# Patient Record
Sex: MALE | Race: BLACK OR AFRICAN AMERICAN | NOT HISPANIC OR LATINO | Employment: OTHER | URBAN - METROPOLITAN AREA
[De-identification: names, ages, dates, MRNs, and addresses within clinical notes are randomized per-mention and may not be internally consistent; named-entity substitution may affect disease eponyms.]

---

## 2017-05-21 ENCOUNTER — HOSPITAL ENCOUNTER (EMERGENCY)
Facility: OTHER | Age: 47
Discharge: HOME | End: 2017-05-21
Attending: EMERGENCY MEDICINE
Payer: COMMERCIAL

## 2017-05-21 VITALS
BODY MASS INDEX: 38.43 KG/M2 | WEIGHT: 290 LBS | SYSTOLIC BLOOD PRESSURE: 140 MMHG | RESPIRATION RATE: 12 BRPM | HEIGHT: 73 IN | TEMPERATURE: 98 F | OXYGEN SATURATION: 96 % | HEART RATE: 78 BPM | DIASTOLIC BLOOD PRESSURE: 88 MMHG

## 2017-05-21 DIAGNOSIS — R07.9 CHEST PAIN: ICD-10-CM

## 2017-05-21 DIAGNOSIS — R07.89 ATYPICAL CHEST PAIN: Primary | ICD-10-CM

## 2017-05-21 LAB
ALBUMIN SERPL BCP-MCNC: 3.8 G/DL
ALLENS TEST: ABNORMAL
ALP SERPL-CCNC: 57 U/L
ALT SERPL W/O P-5'-P-CCNC: 17 U/L
ANION GAP SERPL CALC-SCNC: 19 MMOL/L
APTT BLDCRRT: 26.5 SEC
AST SERPL-CCNC: 15 U/L
BASOPHILS # BLD AUTO: 0.01 K/UL
BASOPHILS NFR BLD: 0.2 %
BILIRUB SERPL-MCNC: 0.7 MG/DL
BUN SERPL-MCNC: 15 MG/DL
CALCIUM SERPL-MCNC: 9.6 MG/DL
CHLORIDE SERPL-SCNC: 102 MMOL/L
CK SERPL-CCNC: 203 U/L
CO2 SERPL-SCNC: 16 MMOL/L
CREAT SERPL-MCNC: 1 MG/DL
DELSYS: ABNORMAL
DIFFERENTIAL METHOD: ABNORMAL
EOSINOPHIL # BLD AUTO: 0.2 K/UL
EOSINOPHIL NFR BLD: 3.7 %
ERYTHROCYTE [DISTWIDTH] IN BLOOD BY AUTOMATED COUNT: 13.8 %
ERYTHROCYTE [SEDIMENTATION RATE] IN BLOOD BY WESTERGREN METHOD: 18 MM/H
EST. GFR  (AFRICAN AMERICAN): >60 ML/MIN/1.73 M^2
EST. GFR  (NON AFRICAN AMERICAN): >60 ML/MIN/1.73 M^2
FIO2: 21
GIANT PLATELETS BLD QL SMEAR: PRESENT
GLUCOSE SERPL-MCNC: 184 MG/DL
HCO3 UR-SCNC: 29.4 MMOL/L (ref 24–28)
HCT VFR BLD AUTO: 41.3 %
HGB BLD-MCNC: 14 G/DL
INR PPP: 1
LYMPHOCYTES # BLD AUTO: 1.7 K/UL
LYMPHOCYTES NFR BLD: 36.4 %
MAGNESIUM SERPL-MCNC: 1.9 MG/DL
MCH RBC QN AUTO: 28.5 PG
MCHC RBC AUTO-ENTMCNC: 33.9 %
MCV RBC AUTO: 84 FL
MODE: ABNORMAL
MONOCYTES # BLD AUTO: 0.3 K/UL
MONOCYTES NFR BLD: 6.7 %
NEUTROPHILS # BLD AUTO: 2.5 K/UL
NEUTROPHILS NFR BLD: 53 %
PCO2 BLDA: 46 MMHG (ref 35–45)
PH SMN: 7.41 [PH] (ref 7.35–7.45)
PLATELET # BLD AUTO: 124 K/UL
PLATELET BLD QL SMEAR: ABNORMAL
PMV BLD AUTO: 12.8 FL
PO2 BLDA: 34 MMHG (ref 40–60)
POC BE: 5 MMOL/L
POC SATURATED O2: 65 % (ref 95–100)
POTASSIUM SERPL-SCNC: 3.7 MMOL/L
PROT SERPL-MCNC: 8.1 G/DL
PROTHROMBIN TIME: 10.7 SEC
RBC # BLD AUTO: 4.92 M/UL
SAMPLE: ABNORMAL
SITE: ABNORMAL
SODIUM SERPL-SCNC: 137 MMOL/L
SP02: 96
TROPONIN I SERPL DL<=0.01 NG/ML-MCNC: 0.01 NG/ML
TROPONIN I SERPL DL<=0.01 NG/ML-MCNC: 0.01 NG/ML
WBC # BLD AUTO: 4.62 K/UL

## 2017-05-21 PROCEDURE — 80053 COMPREHEN METABOLIC PANEL: CPT

## 2017-05-21 PROCEDURE — 85610 PROTHROMBIN TIME: CPT

## 2017-05-21 PROCEDURE — 85025 COMPLETE CBC W/AUTO DIFF WBC: CPT

## 2017-05-21 PROCEDURE — 85730 THROMBOPLASTIN TIME PARTIAL: CPT

## 2017-05-21 PROCEDURE — 82550 ASSAY OF CK (CPK): CPT

## 2017-05-21 PROCEDURE — 83735 ASSAY OF MAGNESIUM: CPT

## 2017-05-21 PROCEDURE — 99900035 HC TECH TIME PER 15 MIN (STAT)

## 2017-05-21 PROCEDURE — 84484 ASSAY OF TROPONIN QUANT: CPT | Mod: 91

## 2017-05-21 PROCEDURE — 99284 EMERGENCY DEPT VISIT MOD MDM: CPT

## 2017-05-21 PROCEDURE — 82803 BLOOD GASES ANY COMBINATION: CPT

## 2017-05-21 PROCEDURE — 93005 ELECTROCARDIOGRAM TRACING: CPT

## 2017-05-21 PROCEDURE — 25000003 PHARM REV CODE 250: Performed by: EMERGENCY MEDICINE

## 2017-05-21 RX ORDER — SUCRALFATE 1 G/10ML
1 SUSPENSION ORAL
Status: COMPLETED | OUTPATIENT
Start: 2017-05-21 | End: 2017-05-21

## 2017-05-21 RX ORDER — METFORMIN HYDROCHLORIDE EXTENDED-RELEASE TABLETS 500 MG/1
500 TABLET, FILM COATED, EXTENDED RELEASE ORAL
COMMUNITY
End: 2017-05-23

## 2017-05-21 RX ORDER — SUCRALFATE 1 G/1
1 TABLET ORAL 2 TIMES DAILY
Qty: 10 TABLET | Refills: 0 | Status: SHIPPED | OUTPATIENT
Start: 2017-05-21 | End: 2017-05-26

## 2017-05-21 RX ORDER — ASPIRIN 81 MG/1
81 TABLET ORAL DAILY
COMMUNITY

## 2017-05-21 RX ADMIN — SUCRALFATE 1 G: 1 SUSPENSION ORAL at 04:05

## 2017-05-21 NOTE — ED NOTES
Hourly rounding completed on pt. Pt reporting intermittent 2/10 chest discomfort, last occurring at approx 1818, denies SOB, fevers, chills, N/V/D. NAD, pt resting comfortably in stretcher, even, unlabored respirations. Bed in lowest, locked position, side rails up x 2.

## 2017-05-21 NOTE — ED PROVIDER NOTES
"Encounter Date: 5/21/2017    SCRIBE #1 NOTE: I, Viraul Marquez, am scribing for, and in the presence of, Dr. Bryant.       History     Chief Complaint   Patient presents with    Chest Pain     Pt sent from urgent care for eval of ST elevation noted on EKG taken there. Pt reports midsternal chest pain that started last night. Dizziness. Denies SOB, HA, n/v.      Review of patient's allergies indicates:  Allergies no known allergies  Time seen by provider: 3:50 PM    This is a 47 y.o. male who presents to the ED on the referral of Urgent Care with complaint of intermittent CP that has persisted for the past week. The patient states that his discomfort has felt similar to a "gas-like" sensation, which occasionally radiates to the middle of his back.  He states that his discomfort is worse after eating, but not with exertion. He reports that he has had some eructation, which has provided some relief from his discomfort. Although he attempted to alleviate his discomfort with Tums, he claims that he found no relief. This morning, the patient states that he began to feel a numbness in his chest, "like I couldn't feel my heart beat."  The pain did not move into his jaw or arms.  There was no associated shortness of breath or diaphoresis.  He denies fever, chills, congestion, abdominal pain, nausea or vomiting.  He also denies dysuria, changes in urinary frequency or urgency. He reports no identifying, alleviating, or exacerbating factors.     He admits to history of DM and HLD, for which he is supposed to take medication. The patient admits that he is non-compliant with his diabetes medication and controls his diabetes with diet. He also admits that he is non-compliant with his HLD medication, as he frequently forgets to take it. He does not have a PCP currently. He has never seen a cardiologist nor had a prior stress test or cardiac catheterization. Although he no longer smokes cigarettes, he admits that he smoked 1 pack " of cigarettes per day for approximately 30 years.  He has no additional complaints.    Additional past medical, surgical, and social history as outlined in the nursing assessment was reviewed by me.     The history is provided by the patient.     Past Medical History:   Diagnosis Date    Diabetes mellitus     High cholesterol      History reviewed. No pertinent surgical history.  History reviewed. No pertinent family history.  Social History   Substance Use Topics    Smoking status: Former Smoker    Smokeless tobacco: Not on file    Alcohol use No     Review of Systems   Constitutional: Negative for chills, diaphoresis and fever.   HENT: Negative for congestion, rhinorrhea and sore throat.         Negative for jaw pain.   Respiratory: Negative for cough and shortness of breath.    Cardiovascular: Positive for chest pain. Negative for leg swelling.   Gastrointestinal: Negative for abdominal pain, diarrhea, nausea and vomiting.        Positive for eructation.   Endocrine: Negative for polyuria.   Genitourinary: Negative for dysuria, frequency and urgency.   Musculoskeletal: Positive for back pain.        Negative for arm pain.   Skin: Negative for rash.   Allergic/Immunologic: Negative for immunocompromised state.   Neurological: Negative for dizziness and weakness.   Hematological: Negative for adenopathy. Does not bruise/bleed easily.   Psychiatric/Behavioral: Negative for confusion.       Physical Exam     Initial Vitals [05/21/17 1457]   BP Pulse Resp Temp SpO2   (!) 169/94 89 20 98 °F (36.7 °C) 98 %     Physical Exam    Nursing note and vitals reviewed.  Constitutional: Vital signs are normal. He appears well-developed and well-nourished. He is not diaphoretic. He does not appear ill. No distress.   HENT:   Head: Normocephalic and atraumatic.   Right Ear: External ear normal.   Left Ear: External ear normal.   Eyes: Conjunctivae and EOM are normal. Right eye exhibits no discharge. Left eye exhibits no  discharge. No scleral icterus.   Neck: Normal range of motion. Neck supple. No tracheal deviation present. No JVD present.   Cardiovascular: Normal rate, regular rhythm, S1 normal, S2 normal, normal heart sounds and intact distal pulses. Exam reveals no gallop and no friction rub.    No murmur heard.  Pulmonary/Chest: Breath sounds normal. No stridor. No respiratory distress. He has no rhonchi. He has no rales. He exhibits no tenderness.   Abdominal: Soft. Bowel sounds are normal. He exhibits no distension and no mass. There is no tenderness. There is no rebound and no guarding.   Musculoskeletal: Normal range of motion. He exhibits edema.   Trace edema to bilateral lower extremities.   Neurological: He is alert and oriented to person, place, and time. He has normal strength. No cranial nerve deficit. He exhibits normal muscle tone. Gait normal.   Skin: Skin is warm and dry. Capillary refill takes less than 2 seconds. No rash noted. No pallor.   Psychiatric: He has a normal mood and affect. Thought content normal.         ED Course   Procedures  Labs Reviewed   CBC W/ AUTO DIFFERENTIAL - Abnormal; Notable for the following:        Result Value    Platelets 124 (*)     Platelet Estimate Decreased (*)     All other components within normal limits   COMPREHENSIVE METABOLIC PANEL - Abnormal; Notable for the following:     CO2 16 (*)     Glucose 184 (*)     Anion Gap 19 (*)     All other components within normal limits    Narrative:     Recoll. [id number] by JAMES at 05/21/2017 16:11, reason: 3+ homolyzed   specimen. Notified to Otoniel Braeden.   CK - Abnormal; Notable for the following:      (*)     All other components within normal limits    Narrative:     Recoll. [id number] by JAMES at 05/21/2017 16:11, reason: 3+ homolyzed   specimen. Notified to Otoniel Braeden.   ISTAT PROCEDURE - Abnormal; Notable for the following:     POC PCO2 46.0 (*)     POC PO2 34 (*)     POC HCO3 29.4 (*)     POC SATURATED O2 65 (*)      All other components within normal limits   PROTIME-INR   APTT   TROPONIN I   MAGNESIUM    Narrative:     Recoll. [id number] by JAMES at 05/21/2017 16:11, reason: 3+ homolyzed   specimen. Notified to Otoniel Deras.   TROPONIN I      Imaging Results          X-Ray Chest AP Portable (Final result)  Result time 05/21/17 15:39:16    Final result by Alok Oglesby MD (05/21/17 15:39:16)                 Impression:       No acute intrathoracic process.          Electronically signed by: ALOK OGLESBY MD  Date:     05/21/17  Time:    15:39              Narrative:    [id number]  Accession # [id number]      Study:  XR CHEST AP PORTABLE    Indication: Chest pain.    Comparison: None.    Findings:     XR CHEST AP PORTABLE.    Cardiac silhouette is normal in appearance.  Pulmonary vasculature is within normal limits.    Lungs well-aerated.  No focal consolidation.   No pleural effusion.  Osseous structures demonstrate no significant abnormalities.                              EKG Readings: (Independently Interpreted)   Initial Reading: No STEMI.   Rhythm: Normal Sinus Rhythm. Heart Rate: 87. Ectopy: No Ectopy. Conduction: Normal. ST Segments: 1 mm ST Elevation in V3. No reciprocal changes. T Waves: Normal. Q waves: Q wave in V2. Clinical Impression: Normal. This EKG is unchanged from 1 hour prior at Urgent Care.        X-Rays:   Independently Interpreted Readings:   Chest X-Ray: X-Ray Chest AP Portable: No infiltrate, effusion, pneumothorax, or acute process.      Medical Decision Making:   Initial Assessment:   Patient presents with CP. He is low risk for ACS; however, he has signs of septal injury on EKG with no prior EKG for comparison. He attributes his symptoms to gas. I will give him Carafate at this time. I will send a 0 and 3 hour troponins. I will reassess.  Independently Interpreted Test(s):   I have ordered and independently interpreted X-rays - see prior notes.  I have ordered and independently interpreted EKG  Reading(s) - see prior notes  Clinical Tests:   Lab Tests: Ordered and Reviewed  Radiological Study: Ordered and Reviewed  Medical Tests: Ordered and Reviewed  ED Management:  19:00 - patient's troponins were normal. His mediastinum was normal on his xray. I did not suspect aortic dissection. His symptoms resolved prior to receiving Carafate but he notes brief, intermittent return of his discomfort throughout the ED course.  He was pain-free during my last assessment.  I stressed to him the importance of close PCP follow-up to obtain outpatient stress test.  He understands PCP could refer him to a gastroenterologist for an upper endoscopy to rule out ulcer or esophageal spasm as the source of his discomfort.  He also needs PCP to evaluate his diabetes.  He remains comfortable and well-appearing. I have discussed with patient the diagnostic results, diagnosis, treatment plan, and need for follow-up. Patient has expressed understanding of my instructions. I am comfortable with his discharge home at this time.              Scribe Attestation:   Scribe #1: I performed the above scribed service and the documentation accurately describes the services I performed. I attest to the accuracy of the note.    Attending Attestation:           Physician Attestation for Scribe:  Physician Attestation Statement for Scribe #1: I, Dr. Bryant, reviewed documentation, as scribed by Vi Marquez in my presence, and it is both accurate and complete.                 ED Course     1. Atypical chest pain    2. Chest pain             Marlen Bryant MD  05/22/17 0283

## 2017-05-21 NOTE — ED NOTES
Pt ambulated to restroom with slow and steady gait. Pt reporting continued 2/10 chest discomfort. Denies lightheadedness/dizziness. Will continue to monitor.

## 2017-05-21 NOTE — ED TRIAGE NOTES
"Pt reports to ED c/o 2/10 midsternal chest discomfort, pt describes pain as "heavy, numbness" x 2 days, pt states "woke up this morning not feeling like myself" with episode of dizziness, fatigue. Pt admits to having "acid reflux" x 1 week unrelieved from tums. Denies SOB, N/V/D, swelling to lower extremities, fevers. PMH of hyperlipidemia, noncompliant with metformin. AAO x 3.   "

## 2017-05-21 NOTE — ED NOTES
Hourly rounding completed on pt, MD at bedside discussing plan of care. Pt reporting 0/10 chest pain at this time. Bed in lowest, locked position, side rails up x 2. Call light within reach.

## 2017-05-23 ENCOUNTER — OFFICE VISIT (OUTPATIENT)
Dept: INTERNAL MEDICINE | Facility: CLINIC | Age: 47
End: 2017-05-23
Payer: COMMERCIAL

## 2017-05-23 VITALS
BODY MASS INDEX: 38.49 KG/M2 | SYSTOLIC BLOOD PRESSURE: 128 MMHG | WEIGHT: 290.44 LBS | HEIGHT: 73 IN | DIASTOLIC BLOOD PRESSURE: 88 MMHG | HEART RATE: 79 BPM | OXYGEN SATURATION: 96 %

## 2017-05-23 DIAGNOSIS — R07.9 CHEST PAIN, UNSPECIFIED TYPE: Primary | ICD-10-CM

## 2017-05-23 DIAGNOSIS — E55.9 VITAMIN D DEFICIENCY DISEASE: ICD-10-CM

## 2017-05-23 DIAGNOSIS — E13.9 DIABETES MELLITUS DUE TO ABNORMAL INSULIN: ICD-10-CM

## 2017-05-23 DIAGNOSIS — E53.8 VITAMIN B12 DEFICIENCY: ICD-10-CM

## 2017-05-23 LAB
CREAT UR-MCNC: 144 MG/DL
MICROALBUMIN UR DL<=1MG/L-MCNC: 330 UG/ML
MICROALBUMIN/CREATININE RATIO: 229.2 UG/MG

## 2017-05-23 PROCEDURE — 82570 ASSAY OF URINE CREATININE: CPT

## 2017-05-23 PROCEDURE — 99999 PR PBB SHADOW E&M-EST. PATIENT-LVL III: CPT | Mod: PBBFAC,,, | Performed by: INTERNAL MEDICINE

## 2017-05-23 PROCEDURE — 3060F POS MICROALBUMINURIA REV: CPT | Mod: 8P,S$GLB,, | Performed by: INTERNAL MEDICINE

## 2017-05-23 PROCEDURE — 99205 OFFICE O/P NEW HI 60 MIN: CPT | Mod: S$GLB,,, | Performed by: INTERNAL MEDICINE

## 2017-05-23 PROCEDURE — 1160F RVW MEDS BY RX/DR IN RCRD: CPT | Mod: S$GLB,,, | Performed by: INTERNAL MEDICINE

## 2017-05-23 PROCEDURE — 3046F HEMOGLOBIN A1C LEVEL >9.0%: CPT | Mod: 8P,S$GLB,, | Performed by: INTERNAL MEDICINE

## 2017-05-23 NOTE — PROGRESS NOTES
Chief Complaint: Left arm numbness and pain    HPI: This is a 47 year old man who presents for an initial visit due to left arm pain and numbness. He had an episode of left arm pain and numbness in the left forearm for 30 minutes while he had his left hand on the steering wheel.  Last night he had a heaviness in the left arm while driving - last 20 to 30 minutes.  No associated neck pain, chest pain, shortness of breath, palpitaions, nausea, vomiting, constipoation, diarrhea.     He presented to the ED on 17 due to chest pain. He states he has intermittent nagging sensation in this chest.  Pain is mostly at night and keep him awake in the middle of the night. HE was diagnosed with GERD in the ED and was placed on sucralfate which has not hlped    He has diabetes and high cholesterol. He is not taking medicaiton for diabetes. HE states his blood sugars are between . He checks his sugar 1-2 times a month. He was taking metformin ER and gemfibrozil for his cholesterol.    Past Medical History:   Diagnosis Date    Diabetes mellitus     High cholesterol      History reviewed. No pertinent surgical history.  Social History     Social History    Marital status:      Spouse name: N/A    Number of children: N/A    Years of education: N/A     Occupational History    Not on file.     Social History Main Topics    Smoking status: Former Smoker     Packs/day: 1.00     Years: 25.00     Types: Cigarettes     Quit date:     Smokeless tobacco: Not on file    Alcohol use No    Drug use: No    Sexual activity: Yes     Partners: Female     Birth control/ protection: None     Other Topics Concern    Not on file     Social History Narrative    . 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 , tropical fish breeder     Family Status   Relation Status    Mother     Father  at age 56    Brother Alive    Maternal Grandmother     Maternal Grandfather     Paternal  "Grandmother     Paternal Grandfather     Brother Alive    Brother Alive    Brother Alive       Meds and allergies: updated on EPIC    Review of Systems:  General: No fever, chills, night sweats, weight loss, fatigue  HEENT: No visual changes, diplopia, hearing loss, sinus congestion, sore throat, post nasal drip  Resp:  No cough, shortness of breath, dyspnea on exertion  GI: No nausea, vomiting, constipation, diarrhea, melana, bloody stools  : No dysuria, hematuria, incontinence, incomplete void, dribbling, nocturia.  Musculoskeletal: No joint pain, muscle pain  Neuro: No headaches, seizures, numbness  Endocrine: No polydipsia, polyuria, hot or cold intolerance.  Heme: No anemia, easy bruising.  Skin: No rashes, hairloss.    Physical exam:  /88 (BP Location: Right arm, Patient Position: Sitting, BP Method: Manual)   Pulse 79   Ht 6' 1" (1.854 m)   Wt 131.8 kg (290 lb 7.3 oz)   SpO2 96%   BMI 38.32 kg/m²     General: alert, oriented x 3, no apparent distress.  Affect normal  HEENT: Conjunctivae: anicteric, PERRL, EOMI, TM clear, Oralpharynx clear  Neck: supple, no thyroid enlargement, no cervical lymphadenopathy  Resp: effort normal, lungs clear bilaterally  CV: Regular rate and rhythm without murmurs, gallops or rubs, no lower extremity edema,   Abdomen: soft, non-distended, non-tender, bowel sounds present, no hepatosplenomegaly.  Neuro: Cranial Nerves II-XII intact, strength 5/5 bilateral deltoids, biceps, triceps, hand ,   Tinel sign positive on the left    Protective Sensation (w/ 10 gram monofilament):  Right: Intact  Left: Intact    Visual Inspection:  Onychomycosis -  Bilateral    Pedal Pulses:   Right: Present  Left: Present    Posterior tibialis:   Right:Present  Left: Present    Assessment/Plan:  1. Left carpal tunnel syndrome- brace to the left hand.  2. Chest pain - stress echo  3. Diabetes -labs, urine and eye exam  4. Hyperlipidemia -labs  5. Obesity - discussed " diet, exercise and weight loss.  I will follow up with his testing and make further recs from there

## 2017-05-24 ENCOUNTER — TELEPHONE (OUTPATIENT)
Dept: INTERNAL MEDICINE | Facility: CLINIC | Age: 47
End: 2017-05-24

## 2017-05-24 ENCOUNTER — HOSPITAL ENCOUNTER (OUTPATIENT)
Dept: CARDIOLOGY | Facility: CLINIC | Age: 47
Discharge: HOME | End: 2017-05-24
Payer: COMMERCIAL

## 2017-05-24 DIAGNOSIS — E13.9 DIABETES MELLITUS DUE TO ABNORMAL INSULIN: Primary | ICD-10-CM

## 2017-05-24 DIAGNOSIS — R07.9 CHEST PAIN, UNSPECIFIED TYPE: ICD-10-CM

## 2017-05-24 LAB
DIASTOLIC DYSFUNCTION: NO
RETIRED EF AND QEF - SEE NOTES: 60 (ref 55–65)

## 2017-05-24 PROCEDURE — 93351 STRESS TTE COMPLETE: CPT | Mod: S$GLB,,, | Performed by: INTERNAL MEDICINE

## 2017-05-24 PROCEDURE — 93321 DOPPLER ECHO F-UP/LMTD STD: CPT | Mod: S$GLB,,, | Performed by: INTERNAL MEDICINE

## 2017-05-24 RX ORDER — LISINOPRIL 5 MG/1
5 TABLET ORAL DAILY
Qty: 30 TABLET | Refills: 3 | Status: SHIPPED | OUTPATIENT
Start: 2017-05-24 | End: 2017-07-14 | Stop reason: SDUPTHER

## 2017-05-24 RX ORDER — FENOFIBRIC ACID 135 MG/1
1 CAPSULE, DELAYED RELEASE ORAL DAILY
Qty: 30 CAPSULE | Refills: 3 | Status: SHIPPED | OUTPATIENT
Start: 2017-05-24 | End: 2017-07-14 | Stop reason: SDUPTHER

## 2017-05-24 RX ORDER — ERGOCALCIFEROL 1.25 MG/1
50000 CAPSULE ORAL
Qty: 25 CAPSULE | Refills: 4 | Status: SHIPPED | OUTPATIENT
Start: 2017-05-25 | End: 2018-10-15

## 2017-05-24 NOTE — TELEPHONE ENCOUNTER
----- Message from Christine Hart sent at 5/24/2017  1:33 PM CDT -----  Contact: Patient  Type: Test Results    What test was performed? Stress test and lab work     Who ordered the test? Dr. Hebert    When and where were the test performed?  5/24/17, [facility]     Comments: Please call the patient at [phone]    Thanks!

## 2017-05-24 NOTE — TELEPHONE ENCOUNTER
Please notify pt the following    Stress test reveals that your heart is fine. NO blood flow abnormalities to your heart    Your blood work reveals that your triglycerides are extremely elevated at 2,998 (normal is less than 150)  You need to start on medication for your triglycerides.   Dr FLOYD wants you to take fenofibrate 135 mg once daily for your triglycerides    Your blood sugars are high. You need medication for your diabetes.  Dr FLOYD wants you to see the diabetic educator and the dietician for your diabetes.    Your vitamin D level is very los  Start on prescription vitamin D 50,000 units twice weekly for your low vitamin D levels.    Your urine reveals that you are spilling protein in your urine which means that the diabetes is affecting your kidneys  You need to start on a blood pressure medication, lisinopril 5 mg daily to protect your kidneys from the diabetes    Your blood count, liver function, thyroid function are fine    Dr FLOYD wants to see you in 2 months with labs prior    [facility]    book following [facility]    Dietician  Diabetic educator  Follow up with DR FLOYD in 2 months with fasting labs

## 2017-05-25 ENCOUNTER — TELEPHONE (OUTPATIENT)
Dept: INTERNAL MEDICINE | Facility: CLINIC | Age: 47
End: 2017-05-25

## 2017-05-25 RX ORDER — METFORMIN HYDROCHLORIDE EXTENDED-RELEASE TABLETS 500 MG/1
TABLET, FILM COATED, EXTENDED RELEASE ORAL
Qty: 30 TABLET | Refills: 3 | Status: SHIPPED | OUTPATIENT
Start: 2017-05-25 | End: 2017-07-03 | Stop reason: SDUPTHER

## 2017-05-25 NOTE — TELEPHONE ENCOUNTER
----- Message from Radha Sanders MA sent at 5/25/2017 11:42 AM CDT -----  Contact: [phone]  Type: Rx    Name of medication(s): metformin (FORTAMET) 500 mg 24 hr tablet     Is this a refill? New rx? Refill    Who prescribed medication?    Pharmacy Name, Phone, & Location: [facility] [place] [address] [facility] [address]    Comments: Please advise. Thanks!

## 2017-05-31 ENCOUNTER — INITIAL CONSULT (OUTPATIENT)
Dept: OPTOMETRY | Facility: CLINIC | Age: 47
End: 2017-05-31
Payer: COMMERCIAL

## 2017-05-31 DIAGNOSIS — H52.4 PRESBYOPIA OU: ICD-10-CM

## 2017-05-31 DIAGNOSIS — E11.9 DIABETIC EYE EXAM: Primary | ICD-10-CM

## 2017-05-31 DIAGNOSIS — Z01.00 DIABETIC EYE EXAM: Primary | ICD-10-CM

## 2017-05-31 DIAGNOSIS — E11.9 TYPE 2 DIABETES MELLITUS WITHOUT RETINOPATHY: ICD-10-CM

## 2017-05-31 PROCEDURE — 99999 PR PBB SHADOW E&M-EST. PATIENT-LVL III: CPT | Mod: PBBFAC,,, | Performed by: OPTOMETRIST

## 2017-05-31 PROCEDURE — 92015 DETERMINE REFRACTIVE STATE: CPT | Mod: S$GLB,,, | Performed by: OPTOMETRIST

## 2017-05-31 PROCEDURE — 92004 COMPRE OPH EXAM NEW PT 1/>: CPT | Mod: S$GLB,,, | Performed by: OPTOMETRIST

## 2017-05-31 RX ORDER — GEMFIBROZIL 600 MG/1
600 TABLET, FILM COATED ORAL
COMMUNITY
End: 2017-07-14

## 2017-05-31 NOTE — LETTER
Miladis 3, 2017      Lorena Hebert MD  [address]  [facility] [address]           [facility]  [address]  [facility] [address]  Phone: [phone]  Fax: [phone]          Patient: Jesusita De La Cruz   MR Number: [id number]   YOB: 1970   Date of Visit: 5/31/2017       Dear Dr. Lorena Hebert:    Thank you for referring Jesusita De La Cruz to me for evaluation. Attached you will find relevant portions of my assessment and plan of care.    If you have questions, please do not hesitate to call me. I look forward to following Jseusita De La Cruz along with you.    Sincerely,        Enclosure  CC:  No Recipients    If you would like to receive this communication electronically, please contact [email or web address] or [phone] to request more information on [facility] access.    For providers and/or their staff who would like to refer a patient to [facility], please contact us through our one-stop-shop provider referral line, [facility], at [phone].    If you feel you have received this communication in error or would no longer like to receive these types of communications, please e-mail [email or web address]

## 2017-05-31 NOTE — PATIENT INSTRUCTIONS
Good ocular health in both eyes.  No evidence of diabetic retinopathy in either eye.  Emmetropia at distance in each eye.  Presbyopia (difficulty focusing at near) consistent with age.  Spectacle lens Rx issued for use as desired.  Recheck in 12 -1 8 months - or prior if any problems in the interim.

## 2017-05-31 NOTE — PROGRESS NOTES
HPI     Diabetic Eye Exam    Additional comments: general eye examination and refraction.   Diabetic   eye exam .  Had discontinued use of Metformin, but resumed use YESTERDAY.     Notes problems with near VA, but notes no apparent problem with distance   VA.            Comments   Patient's age: 47 y.o. AAM  Occupation: Fish Technician   Approximate date of last eye examination:  Unsure   Name of last eye doctor seen:   City/State:   Wears glasses? No     If yes, wears  Full-time or part-time?    Present glasses are: Bifocal, SV Distance, SV Reading?    Approximate age of present glasses:     Got new glasses following last exam, or subsequently?:     Any problem with VA with glasses?    Wears CLs?:  No           If yes:              Type of CL worn:                Wears full-time or part-time:                 Sleeps with contact lenses:                 CL Solution used:                 How often replace CLs:                Any problem with VA with CLs?                Has patient read and signed the contact lens fee information   document?   Headaches?  Not to often   Eye pain/discomfort?  No                                                                                     Flashes?  No  Floaters?  No  Diplopia/Double vision?  No  Patient's Ocular History:         Any eye surgeries? No         Any eye injury?  No         Any treatment for eye disease?  No  Family history of eye disease?  No  Significant patient medical history:         1. Diabetes?  Yes , pt don't take blood sugar everyday        If yes, IDDM or NIDDM?   ..Hemoglobin A1C       Date                     Value               Ref Range             Status                05/24/2017               8.4 (H)             4.5 - 6.2 %           Final              Comment:    According to ADA guidelines, hemoglobin A1C <7.0% represents  optimal   control in non-pregnant diabetic patients.  Different  metrics may apply   to specific populations.   Standards of  "Medical Care in Diabetes -   2016.  For the purpose of screening for the presence of diabetes:  <5.7%       Consistent with the absence of diabetes  5.7-6.4%  Consistent with   increasing risk for diabetes   (prediabetes)  >or=6.5%  Consistent with   diabetes  Currently no consensus exists for use of hemoglobin A1C  for   diagnosis of diabetes for children.    ----------   2. HBP?  No              3. Other (describe):     ! OTC eyedrops currently using:  No   ! Prescription eye meds currently using:  No   ! Any history of allergy/adverse reaction to any eye meds used   previously?  No    ! Any history of allergy/adverse reaction to eyedrops used during prior   eye exam(s)? No    ! Any history of allergy/adverse reaction to Novacaine or similar meds?   No   ! Any history of allergy/adverse reaction to Epinephrine or similar meds?   No    ! Patient okay with use of anesthetic eyedrops to check eye pressure?    Yes         ! Patient okay with use of eyedrops to dilate pupils today?  Yes    !  Allergies/Medications/Medical History/Family History reviewed today?    Yes       PD =   70/66  Desired reading distance =  18"                                                                  Last edited by Lobito Fontana, OD on 5/31/2017  3:20 PM. (History)            Assessment /Plan     For exam results, see Encounter Report.    1. Diabetic eye exam     2. Type 2 diabetes mellitus without retinopathy     3. Presbyopia OU                    Good ocular health in both eyes.  No evidence of diabetic retinopathy in either eye.  Emmetropia at distance in each eye.  Presbyopia (difficulty focusing at near) consistent with age.  Spectacle lens Rx issued for use as desired.  Recheck in 12 -1 8 months - or prior if any problems in the interim.       "

## 2017-06-09 ENCOUNTER — CLINICAL SUPPORT (OUTPATIENT)
Dept: DIABETES | Facility: CLINIC | Age: 47
End: 2017-06-09
Payer: COMMERCIAL

## 2017-06-09 DIAGNOSIS — E13.9 DIABETES MELLITUS DUE TO ABNORMAL INSULIN: ICD-10-CM

## 2017-06-09 PROCEDURE — 99999 PR PBB SHADOW E&M-EST. PATIENT-LVL I: CPT | Mod: PBBFAC,,,

## 2017-06-09 PROCEDURE — G0108 DIAB MANAGE TRN  PER INDIV: HCPCS | Mod: S$GLB,,, | Performed by: INTERNAL MEDICINE

## 2017-06-09 NOTE — PROGRESS NOTES
Diabetes Education  Author: Winifred Panye RD, CDE  Date: 6/9/2017    Diabetes Education Visit  Diabetes Education Record Assessment/Progress: Initial    Diabetes Type  Diabetes Type : Type II    Diabetes History  Diabetes Diagnosis: 1-3 years    Nutrition  Meal Planning: 3 meals per day, snacks between meal, water, eats out often (u/s tea, milk w/ cereal)    Monitoring   Monitoring:  (None - needs a new meter)    Exercise   Exercise Type:  (No formal)    Current Diabetes Treatment   Current Treatment: Oral Medication (Metformin BID)    Social History  Preferred Learning Method: Face to Face, Demonstration, Hands On, Reading Materials  Primary Support: Self  Smoking Status: Ex Smoker  Alcohol Use: Never                             Barriers to Change  Barriers to Change: None  Learning Challenges : None    Readiness to Learn   Readiness to Learn : Acceptance    Cultural Influences  Cultural Influences: No    Diabetes Education Assessment/Progress  Acute Complications (preventing, detecting, and treating acute complications): Instructed, Discussion, Individual Session, Written Materials Provided  Chronic Complications (preventing, detecting, and treating chronic complications): Instructed, Discussion, Individual Session, Written Materials Provided  Diabetes Disease Process (diabetes disease process and treatment options): Instructed, Discussion, Individual Session, Written Materials Provided  Nutrition (Incorporating nutritional management into one's lifestyle): Instructed, Discussion, Individual Session, Written Materials Provided  Medications (states correct name, dose, onset, peak, duration, side effects & timing of meds): Instructed, Discussion, Individual Session, Written Materials Provided  Monitoring (monitoring blood glucose/other parameters & using results): Instructed, Discussion, Individual Session, Written Materials Provided  Goal Setting and Problem Solving (verbalizes behavior change strategies & sets  realistic goals): Instructed, Discussion, Individual Session  Behavior Change (developing personal strategies to health & behavior change): Instructed, Discussion, Individual Session  Psychosocial Issues (developing personal srategies to address psychosocial concerns): Instructed, Discussion, Individual Session    Goals  Monitoring: Set (Check BG BID)  Start Date: 06/09/17  Target Date: 09/14/17         Diabetes Care Plan/Intervention  Education Plan/Intervention: Individual Follow-Up DSMT    Diabetes Meal Plan  Carbohydrate Per Meal: 45-60g  Carbohydrate Per Snack : 7-15g    Education Units of Time   Time Spent: 45 min      Health Maintenance Topics with due status: Not Due       Topic Last Completion Date    Foot Exam 05/23/2017    Lipid Panel 05/24/2017    Hemoglobin A1c 05/24/2017    Eye Exam 05/31/2017    Influenza Vaccine Not Due     Health Maintenance Due   Topic Date Due    TETANUS VACCINE  01/29/1990    Pneumococcal PPSV23 (Medium Risk) (1) 01/29/1990

## 2017-06-11 RX ORDER — LANCETS
1 EACH MISCELLANEOUS 3 TIMES DAILY
Qty: 100 EACH | Refills: 11 | Status: SHIPPED | OUTPATIENT
Start: 2017-06-11 | End: 2021-02-15

## 2017-06-11 RX ORDER — INSULIN PUMP SYRINGE, 3 ML
EACH MISCELLANEOUS
Qty: 1 EACH | Refills: 0 | Status: SHIPPED | OUTPATIENT
Start: 2017-06-11 | End: 2021-02-15

## 2017-07-03 ENCOUNTER — TELEPHONE (OUTPATIENT)
Dept: INTERNAL MEDICINE | Facility: CLINIC | Age: 47
End: 2017-07-03

## 2017-07-03 RX ORDER — METFORMIN HYDROCHLORIDE EXTENDED-RELEASE TABLETS 500 MG/1
TABLET, FILM COATED, EXTENDED RELEASE ORAL
Qty: 60 TABLET | Refills: 3 | Status: SHIPPED | OUTPATIENT
Start: 2017-07-03 | End: 2017-07-12 | Stop reason: SDUPTHER

## 2017-07-03 NOTE — TELEPHONE ENCOUNTER
----- Message from Akilah Pereyra sent at 7/3/2017  8:29 AM CDT -----  Contact: MARTINA De Paz/[phone]  Baldev called in regards need directions on metformin (FORTAMET) 500 mg 24 hr tablet. Patient is needing to know if rx can be fill for 60 days. Please call and advise.           Thank you!!!

## 2017-07-06 ENCOUNTER — PATIENT OUTREACH (OUTPATIENT)
Dept: ADMINISTRATIVE | Facility: HOSPITAL | Age: 47
End: 2017-07-06

## 2017-07-06 ENCOUNTER — DOCUMENTATION ONLY (OUTPATIENT)
Dept: ADMINISTRATIVE | Facility: HOSPITAL | Age: 47
End: 2017-07-06

## 2017-07-12 RX ORDER — METFORMIN HYDROCHLORIDE EXTENDED-RELEASE TABLETS 500 MG/1
TABLET, FILM COATED, EXTENDED RELEASE ORAL
Qty: 60 TABLET | Refills: 0 | Status: SHIPPED | OUTPATIENT
Start: 2017-07-12 | End: 2017-07-14

## 2017-07-14 ENCOUNTER — OFFICE VISIT (OUTPATIENT)
Dept: INTERNAL MEDICINE | Facility: CLINIC | Age: 47
End: 2017-07-14
Payer: COMMERCIAL

## 2017-07-14 VITALS
BODY MASS INDEX: 38.36 KG/M2 | OXYGEN SATURATION: 96 % | TEMPERATURE: 99 F | SYSTOLIC BLOOD PRESSURE: 132 MMHG | DIASTOLIC BLOOD PRESSURE: 78 MMHG | WEIGHT: 289.44 LBS | HEIGHT: 73 IN | HEART RATE: 88 BPM

## 2017-07-14 DIAGNOSIS — E78.1 HYPERTRIGLYCERIDEMIA: ICD-10-CM

## 2017-07-14 DIAGNOSIS — H91.90 HEARING LOSS, UNSPECIFIED HEARING LOSS TYPE, UNSPECIFIED LATERALITY: ICD-10-CM

## 2017-07-14 DIAGNOSIS — E11.42 TYPE 2 DIABETES MELLITUS WITH DIABETIC POLYNEUROPATHY, WITHOUT LONG-TERM CURRENT USE OF INSULIN: Primary | ICD-10-CM

## 2017-07-14 DIAGNOSIS — B35.3 TINEA PEDIS, UNSPECIFIED LATERALITY: ICD-10-CM

## 2017-07-14 PROCEDURE — 99999 PR PBB SHADOW E&M-EST. PATIENT-LVL IV: CPT | Mod: PBBFAC,,, | Performed by: INTERNAL MEDICINE

## 2017-07-14 PROCEDURE — 99214 OFFICE O/P EST MOD 30 MIN: CPT | Mod: S$GLB,,, | Performed by: INTERNAL MEDICINE

## 2017-07-14 PROCEDURE — 3045F PR MOST RECENT HEMOGLOBIN A1C LEVEL 7.0-9.0%: CPT | Mod: S$GLB,,, | Performed by: INTERNAL MEDICINE

## 2017-07-14 PROCEDURE — 4010F ACE/ARB THERAPY RXD/TAKEN: CPT | Mod: S$GLB,,, | Performed by: INTERNAL MEDICINE

## 2017-07-14 RX ORDER — LISINOPRIL 5 MG/1
5 TABLET ORAL DAILY
Qty: 90 TABLET | Refills: 11 | Status: SHIPPED | OUTPATIENT
Start: 2017-07-14 | End: 2018-10-15

## 2017-07-14 RX ORDER — FENOFIBRIC ACID 135 MG/1
1 CAPSULE, DELAYED RELEASE ORAL DAILY
Qty: 90 CAPSULE | Refills: 11 | Status: SHIPPED | OUTPATIENT
Start: 2017-07-14 | End: 2018-01-10

## 2017-07-14 RX ORDER — ECONAZOLE NITRATE 10 MG/G
CREAM TOPICAL 2 TIMES DAILY
Qty: 30 G | Refills: 1 | Status: SHIPPED | OUTPATIENT
Start: 2017-07-14 | End: 2018-10-15

## 2017-07-14 RX ORDER — ATORVASTATIN CALCIUM 40 MG/1
40 TABLET, FILM COATED ORAL DAILY
Qty: 90 TABLET | Refills: 11 | Status: SHIPPED | OUTPATIENT
Start: 2017-07-14 | End: 2018-10-15

## 2017-07-14 RX ORDER — METFORMIN HYDROCHLORIDE 500 MG/1
500 TABLET ORAL 2 TIMES DAILY WITH MEALS
Qty: 180 TABLET | Refills: 3 | Status: SHIPPED | OUTPATIENT
Start: 2017-07-14 | End: 2019-04-16

## 2017-07-14 NOTE — PROGRESS NOTES
Subjective:       Patient ID: Jesusita De La Cruz is a 47 y.o. male.    Chief Complaint: Annual Exam    Poor hearing long standing.    Feet nail issues.    Cannot get metformin due to insurance coverage.    DM2:  marginal med adherence  has changed Diet  90-100s 140 AM sugars  <200 Post-prandial sugars  + Numbness in feet  no sores in feet  no Visual changes  no Polyuria/polydipsia.          Review of Systems   Constitutional: Negative for activity change and unexpected weight change.   HENT: Positive for hearing loss. Negative for rhinorrhea and trouble swallowing.    Eyes: Negative for discharge and visual disturbance.   Respiratory: Negative for chest tightness and wheezing.    Cardiovascular: Negative for chest pain and palpitations.   Gastrointestinal: Negative for blood in stool, constipation, diarrhea and vomiting.   Endocrine: Negative for polydipsia and polyuria.   Genitourinary: Negative for difficulty urinating, hematuria and urgency.   Musculoskeletal: Negative for arthralgias, joint swelling and neck pain.   Neurological: Negative for weakness and headaches.   Psychiatric/Behavioral: Negative for confusion and dysphoric mood.       Objective:      Physical Exam   Constitutional: He is oriented to person, place, and time. He appears well-developed and well-nourished. No distress.   HENT:   Head: Normocephalic and atraumatic.   L ext ear canal cerumen filled, R is clear   Eyes: No scleral icterus.   Neck: Normal range of motion. No thyromegaly present.   Cardiovascular: Normal rate, regular rhythm and normal heart sounds.  Exam reveals no gallop and no friction rub.    No murmur heard.  Pulmonary/Chest: Effort normal and breath sounds normal. No respiratory distress. He has no wheezes. He has no rales.   Abdominal: Soft. Bowel sounds are normal. He exhibits no distension and no mass. There is no tenderness. There is no rebound and no guarding.   Musculoskeletal: Normal range of motion. He exhibits no edema.    Protective Sensation (w/ 10 gram monofilament):  Right: Intact  Left: Intact    Visual Inspection:  Skin Breakdown -  Bilateral with tinea and onychomycotic nails    Pedal Pulses:   Right: normal  Left: normal    Posterior tibialis:   Right:normal  Left: normal     Lymphadenopathy:     He has no cervical adenopathy.   Neurological: He is alert and oriented to person, place, and time.   Skin: No lesion noted.   Psychiatric: He has a normal mood and affect. Thought content normal.       Assessment:       1. Hearing loss, unspecified hearing loss type, unspecified laterality    2. Type 2 diabetes mellitus with diabetic polyneuropathy, without long-term current use of insulin    3. Hypertriglyceridemia    4. Tinea pedis, unspecified laterality        Plan:       Jesusita was seen today for annual exam.    Diagnoses and all orders for this visit:    Hearing loss, unspecified hearing loss type, unspecified laterality  -     Ambulatory Referral to ENT    Type 2 diabetes mellitus with diabetic polyneuropathy, without long-term current use of insulin  -     metformin (GLUCOPHAGE) 500 MG tablet; Take 1 tablet (500 mg total) by mouth 2 (two) times daily with meals.  -     lisinopril (PRINIVIL,ZESTRIL) 5 MG tablet; Take 1 tablet (5 mg total) by mouth once daily.  -     Comprehensive metabolic panel; Future  -     Lipid panel; Future  -     Hemoglobin A1c; Future  -     atorvastatin (LIPITOR) 40 MG tablet; Take 1 tablet (40 mg total) by mouth once daily.  -     Ambulatory referral to Podiatry  Next time bring meter and meds (not sure names).  Blood work 4 weeks.      Hypertriglyceridemia  -     fenofibric acid (FIBRICOR) 135 mg CpDR; Take 1 capsule (135 mg total) by mouth once daily at 6am.    Tinea pedis, unspecified laterality  -     Ambulatory referral to Podiatry  -     econazole nitrate 1 % cream; Apply topically 2 (two) times daily.

## 2017-07-18 DIAGNOSIS — E11.42 TYPE 2 DIABETES MELLITUS WITH DIABETIC POLYNEUROPATHY, WITHOUT LONG-TERM CURRENT USE OF INSULIN: ICD-10-CM

## 2017-07-21 ENCOUNTER — OFFICE VISIT (OUTPATIENT)
Dept: PODIATRY | Facility: CLINIC | Age: 47
End: 2017-07-21
Payer: COMMERCIAL

## 2017-07-21 VITALS — HEIGHT: 73 IN | WEIGHT: 287 LBS | BODY MASS INDEX: 38.04 KG/M2

## 2017-07-21 DIAGNOSIS — B35.1 ONYCHOMYCOSIS DUE TO DERMATOPHYTE: ICD-10-CM

## 2017-07-21 DIAGNOSIS — E11.9 ENCOUNTER FOR COMPREHENSIVE DIABETIC FOOT EXAMINATION, TYPE 2 DIABETES MELLITUS: Primary | ICD-10-CM

## 2017-07-21 DIAGNOSIS — B35.3 TINEA PEDIS OF LEFT FOOT: ICD-10-CM

## 2017-07-21 PROCEDURE — 99999 PR PBB SHADOW E&M-EST. PATIENT-LVL III: CPT | Mod: PBBFAC,,, | Performed by: PODIATRIST

## 2017-07-21 PROCEDURE — 4010F ACE/ARB THERAPY RXD/TAKEN: CPT | Mod: S$GLB,,, | Performed by: PODIATRIST

## 2017-07-21 PROCEDURE — 99203 OFFICE O/P NEW LOW 30 MIN: CPT | Mod: S$GLB,,, | Performed by: PODIATRIST

## 2017-07-21 PROCEDURE — 3045F PR MOST RECENT HEMOGLOBIN A1C LEVEL 7.0-9.0%: CPT | Mod: S$GLB,,, | Performed by: PODIATRIST

## 2017-07-21 RX ORDER — CICLOPIROX OLAMINE 7.7 MG/G
CREAM TOPICAL 2 TIMES DAILY
Qty: 90 G | Refills: 2 | Status: SHIPPED | OUTPATIENT
Start: 2017-07-21 | End: 2018-10-15

## 2017-07-21 RX ORDER — FENOFIBRIC ACID 135 MG/1
CAPSULE, DELAYED RELEASE ORAL
Refills: 3 | COMMUNITY
Start: 2017-05-24 | End: 2018-01-10

## 2017-07-21 RX ORDER — CICLOPIROX 80 MG/ML
SOLUTION TOPICAL NIGHTLY
Qty: 6.6 ML | Refills: 11 | Status: SHIPPED | OUTPATIENT
Start: 2017-07-21 | End: 2018-10-15

## 2017-07-21 NOTE — PROGRESS NOTES
Subjective:      Patient ID: Jesusita De La Cruz is a 47 y.o. male.    Chief Complaint: Diabetic Foot Exam    Diabetes, increased risk amputation needing evaluation/management/optomization of foot care.    Thick dark toenails all ten toes and thick itchy flaking skin.  Gradual onset, worsening over past several weeks, aggravated by increased weight bearing, shoe gear, pressure.  No previous medical treatment.  OTC pain med not helping.    Denies trauma and surgery both feet.    Review of Systems   Constitution: Negative for chills, diaphoresis, fever, malaise/fatigue and night sweats.   Cardiovascular: Negative for claudication, cyanosis, leg swelling and syncope.   Skin: Positive for dry skin, itching and nail changes. Negative for color change, rash, suspicious lesions and unusual hair distribution.   Musculoskeletal: Negative for falls, joint pain, joint swelling, muscle cramps, muscle weakness and stiffness.   Gastrointestinal: Negative for constipation, diarrhea, nausea and vomiting.   Neurological: Negative for brief paralysis, disturbances in coordination, focal weakness, numbness, paresthesias, sensory change and tremors.           Objective:      Physical Exam   Constitutional: He appears well-developed and well-nourished. He is cooperative. No distress.   Cardiovascular:   Pulses:       Popliteal pulses are 2+ on the right side, and 2+ on the left side.        Dorsalis pedis pulses are 2+ on the right side, and 2+ on the left side.        Posterior tibial pulses are 2+ on the right side, and 2+ on the left side.   Capillary refill 3 seconds all toes/distal feet, all toes/both feet warm to touch.      Negative lymphadenopathy bilateral popliteal fossa and tarsal tunnel.      Negavie lower extremity edema bilateral.     Musculoskeletal:        Right ankle: Normal. He exhibits normal range of motion, no swelling, no ecchymosis, no deformity, no laceration and normal pulse. Achilles tendon normal. Achilles tendon  exhibits no pain, no defect and normal Liang's test results.   Normal angle, base, station of gait. All ten toes without clubbing, cyanosis, or signs of ischemia.  No pain to palpation bilateral lower extremities.  Range of motion, stability, muscle strength, and muscle tone normal bilateral feet and legs.     Lymphadenopathy: No inguinal adenopathy noted on the right or left side.   Negative lymphadenopathy bilateral popliteal fossa and tarsal tunnel.   Neurological: He is alert. He has normal strength. He displays no atrophy and no tremor. No sensory deficit. He exhibits normal muscle tone. He displays no seizure activity. Gait normal.   Reflex Scores:       Patellar reflexes are 2+ on the right side and 2+ on the left side.       Achilles reflexes are 2+ on the right side and 2+ on the left side.  Negative tinel sign to percussion sural, superficial peroneal, deep peroneal, saphenous, and posterior tibial nerves right and left ankles and feet.     Skin: Skin is warm, dry and intact. No abrasion, no bruising, no burn, no ecchymosis, no laceration, no lesion and no rash noted. He is not diaphoretic. No cyanosis or erythema. No pallor. Nails show no clubbing.     Dry scale with superficial flakes over an erythematous base  without ulceration, drainage, pus, tracking, fluctuance, malodor, or cardinal signs infection.    Toenails 1st, 2nd, 3rd, 4th, 5th  bilateral are hypertrophic thickened 2-3 mm, dystrophic, discolored tanish brown with tan, gray crumbly subungual debris.  Neatly trimmed and not tender to distal nail plate pressure, without periungual skin abnormality of each.    Otherwise, Skin is normal age and health appropriate color, turgor, texture, and temperature bilateral lower extremities without ulceration, hyperpigmentation, discoloration, masses nodules or cords palpated.  No ecchymosis, erythema, edema, or cardinal signs of infection bilateral lower extremities.               Assessment:        Encounter Diagnoses   Name Primary?    Encounter for comprehensive diabetic foot examination, type 2 diabetes mellitus Yes    Onychomycosis due to dermatophyte     Tinea pedis of left foot          Plan:       Jesusita was seen today for diabetic foot exam.    Diagnoses and all orders for this visit:    Encounter for comprehensive diabetic foot examination, type 2 diabetes mellitus    Onychomycosis due to dermatophyte    Tinea pedis of left foot    Other orders  -     ciclopirox (LOPROX) 0.77 % Crea; Apply topically 2 (two) times daily.  -     ciclopirox (PENLAC) 8 % Soln; Apply topically nightly.      I counseled the patient on his conditions, their implications and medical management.        - Shoe inspection. Diabetic Foot Education. Patient reminded of the importance of good nutrition and blood sugar control to help prevent podiatric complications of diabetes. Patient instructed on proper foot hygeine. We discussed wearing proper shoe gear, daily foot inspections, never walking without protective shoe gear, never putting sharp instruments to feet, routine podiatric nail visits every 2-3 months.      Discussed conservative treatment with shoes of adequate dimensions, material, and style to alleviate symptoms and delay or prevent surgical intervention.    Rx penlac, loprox.        No Follow-up on file.

## 2017-07-21 NOTE — LETTER
July 21, 2017      Jonathan Boateng MD  [address]  [facility] [address]           [facility] - Podiatry  [address]  [facility] [address]  Phone: [phone]          Patient: Jesusita De La Cruz   MR Number: [id number]   YOB: 1970   Date of Visit: 7/21/2017       Dear Dr. Jonathan Boateng:    Thank you for referring Jesusita De La Cruz to me for evaluation. Attached you will find relevant portions of my assessment and plan of care.    If you have questions, please do not hesitate to call me. I look forward to following Jesusita De La Cruz along with you.    Sincerely,    Rodríguez Olmedo, ALBERTA    Enclosure  CC:  No Recipients    If you would like to receive this communication electronically, please contact [email or web address] or [phone] to request more information on [facility] Link access.    For providers and/or their staff who would like to refer a patient to [facility], please contact us through our one-stop-shop provider referral line, [facility], at [phone].    If you feel you have received this communication in error or would no longer like to receive these types of communications, please e-mail [email or web address]

## 2017-10-13 RX ORDER — FENOFIBRIC ACID 135 MG/1
CAPSULE, DELAYED RELEASE ORAL
Qty: 30 CAPSULE | Refills: 0 | Status: SHIPPED | OUTPATIENT
Start: 2017-10-13 | End: 2018-01-05 | Stop reason: SDUPTHER

## 2018-01-10 RX ORDER — FENOFIBRIC ACID 135 MG/1
CAPSULE, DELAYED RELEASE ORAL
Qty: 30 CAPSULE | Refills: 2 | Status: SHIPPED | OUTPATIENT
Start: 2018-01-10 | End: 2018-10-15

## 2018-01-10 NOTE — TELEPHONE ENCOUNTER
Hi, please call him --  I just received a request for the fenofibrate medicine from him. He did not have done the labs I ordered and no showed his followup with me. Please check in with him and offer a followup to better treat his diabetes.  Fenofibrate sent in.  Thank you, Jonathan Boateng

## 2018-01-31 NOTE — TELEPHONE ENCOUNTER
Attempted to contact pt to schedule follow up with PCP. No answer, left voice mail for return call.

## 2018-06-14 ENCOUNTER — PATIENT OUTREACH (OUTPATIENT)
Dept: ADMINISTRATIVE | Facility: HOSPITAL | Age: 48
End: 2018-06-14

## 2018-06-14 NOTE — PROGRESS NOTES
Attempted to contact pt to see if Dr. Boateng is still his PCP and to schedule overdue office visit. No answer, left voice mail for return call.

## 2018-08-23 ENCOUNTER — PATIENT OUTREACH (OUTPATIENT)
Dept: ADMINISTRATIVE | Facility: HOSPITAL | Age: 48
End: 2018-08-23

## 2018-10-10 ENCOUNTER — OFFICE VISIT (OUTPATIENT)
Dept: INTERNAL MEDICINE | Facility: CLINIC | Age: 48
End: 2018-10-10
Payer: COMMERCIAL

## 2018-10-10 VITALS
WEIGHT: 294.56 LBS | TEMPERATURE: 99 F | HEART RATE: 87 BPM | DIASTOLIC BLOOD PRESSURE: 82 MMHG | BODY MASS INDEX: 38.86 KG/M2 | SYSTOLIC BLOOD PRESSURE: 134 MMHG | OXYGEN SATURATION: 97 %

## 2018-10-10 DIAGNOSIS — E11.9 TYPE 2 DIABETES MELLITUS WITHOUT COMPLICATION, WITHOUT LONG-TERM CURRENT USE OF INSULIN: ICD-10-CM

## 2018-10-10 DIAGNOSIS — E78.5 HYPERLIPIDEMIA, UNSPECIFIED HYPERLIPIDEMIA TYPE: ICD-10-CM

## 2018-10-10 DIAGNOSIS — R94.31 ABNORMAL EKG: ICD-10-CM

## 2018-10-10 DIAGNOSIS — R29.898 LEFT ARM WEAKNESS: Primary | ICD-10-CM

## 2018-10-10 PROCEDURE — 3008F BODY MASS INDEX DOCD: CPT | Mod: CPTII,S$GLB,, | Performed by: INTERNAL MEDICINE

## 2018-10-10 PROCEDURE — 93010 ELECTROCARDIOGRAM REPORT: CPT | Mod: S$GLB,,, | Performed by: INTERNAL MEDICINE

## 2018-10-10 PROCEDURE — 99214 OFFICE O/P EST MOD 30 MIN: CPT | Mod: S$GLB,,, | Performed by: INTERNAL MEDICINE

## 2018-10-10 PROCEDURE — 99999 PR PBB SHADOW E&M-EST. PATIENT-LVL III: CPT | Mod: PBBFAC,,, | Performed by: INTERNAL MEDICINE

## 2018-10-10 PROCEDURE — 93005 ELECTROCARDIOGRAM TRACING: CPT | Mod: S$GLB,,, | Performed by: INTERNAL MEDICINE

## 2018-10-10 NOTE — PROGRESS NOTES
Subjective:       Patient ID: Jesusita De La Cruz is a 48 y.o. male.    Chief Complaint: Numbness    48 year old diabetic man complains of weakness and vague heaviness in arms left worse than right for the last 2-3 days.  Thinks it may be stress related as his mother and brother have both  in the last 2 weeks.  He is not taking his lipitor or lisinopril.  He thinks one or the other made him feel funny , so he stopped them both!! Denies SOB with or without exertion.  Does not have actual chest pain      Review of Systems   Constitutional: Negative for activity change, appetite change and fever.   HENT: Negative for congestion, postnasal drip and sore throat.    Respiratory: Negative for cough, shortness of breath and wheezing.    Cardiovascular: Negative for chest pain and palpitations.   Gastrointestinal: Negative for abdominal pain, blood in stool, constipation, diarrhea, nausea and vomiting.   Genitourinary: Negative for decreased urine volume, difficulty urinating, flank pain and frequency.   Musculoskeletal: Negative for arthralgias.   Neurological: Negative for dizziness, weakness and headaches.       Objective:      Physical Exam   Constitutional: He is oriented to person, place, and time. He appears well-developed and well-nourished. No distress.   HENT:   Head: Normocephalic and atraumatic.   Right Ear: External ear normal.   Left Ear: External ear normal.   Eyes: Conjunctivae and EOM are normal. Pupils are equal, round, and reactive to light.   Neck: Normal range of motion. Neck supple. No thyromegaly present.   Cardiovascular: Normal rate and regular rhythm.   Pulmonary/Chest: Effort normal and breath sounds normal.   Abdominal: Soft. Bowel sounds are normal. He exhibits no mass. There is no tenderness. There is no rebound and no guarding.   Musculoskeletal: Normal range of motion.   Lymphadenopathy:     He has no cervical adenopathy.   Neurological: He is alert and oriented to person, place, and time. He  has normal reflexes. He displays normal reflexes. No cranial nerve deficit. He exhibits normal muscle tone. Coordination normal.   Skin: Skin is warm and dry.       Assessment:       1. Left arm weakness    2. Abnormal EKG    3. Type 2 diabetes mellitus without complication, without long-term current use of insulin    4. Hyperlipidemia, unspecified hyperlipidemia type        Plan:   Lanceing was seen today for numbness.    Diagnoses and all orders for this visit:    Left arm weakness  -     EKG 12-lead  -     Ambulatory consult to Cardiology  -     CBC auto differential; Future  -     Comprehensive metabolic panel; Future    Abnormal EKG  -     Ambulatory consult to Cardiology  -     CBC auto differential; Future    Type 2 diabetes mellitus without complication, without long-term current use of insulin  -     CBC auto differential; Future  -     Comprehensive metabolic panel; Future  -     Hemoglobin A1c; Future    Hyperlipidemia, unspecified hyperlipidemia type  -     CBC auto differential; Future  -     Lipid panel; Future    EKG has changed since May of last year.

## 2018-10-11 ENCOUNTER — LAB VISIT (OUTPATIENT)
Dept: LAB | Facility: HOSPITAL | Age: 48
End: 2018-10-11
Payer: COMMERCIAL

## 2018-10-11 DIAGNOSIS — R29.898 LEFT ARM WEAKNESS: ICD-10-CM

## 2018-10-11 DIAGNOSIS — E11.9 TYPE 2 DIABETES MELLITUS WITHOUT COMPLICATION, WITHOUT LONG-TERM CURRENT USE OF INSULIN: ICD-10-CM

## 2018-10-11 DIAGNOSIS — E11.65 UNCONTROLLED TYPE 2 DIABETES MELLITUS WITH HYPERGLYCEMIA: Primary | ICD-10-CM

## 2018-10-11 DIAGNOSIS — E78.5 HYPERLIPIDEMIA, UNSPECIFIED HYPERLIPIDEMIA TYPE: ICD-10-CM

## 2018-10-11 DIAGNOSIS — R94.31 ABNORMAL EKG: ICD-10-CM

## 2018-10-11 LAB
ALBUMIN SERPL BCP-MCNC: 3.9 G/DL
ALP SERPL-CCNC: 65 U/L
ALT SERPL W/O P-5'-P-CCNC: 24 U/L
ANION GAP SERPL CALC-SCNC: 12 MMOL/L
AST SERPL-CCNC: 20 U/L
BASOPHILS # BLD AUTO: 0.02 K/UL
BASOPHILS NFR BLD: 0.5 %
BILIRUB SERPL-MCNC: 0.9 MG/DL
BUN SERPL-MCNC: 11 MG/DL
CALCIUM SERPL-MCNC: 9.8 MG/DL
CHLORIDE SERPL-SCNC: 101 MMOL/L
CHOLEST SERPL-MCNC: 275 MG/DL
CHOLEST/HDLC SERPL: 12 {RATIO}
CO2 SERPL-SCNC: 24 MMOL/L
CREAT SERPL-MCNC: 1 MG/DL
DIFFERENTIAL METHOD: ABNORMAL
EOSINOPHIL # BLD AUTO: 0.2 K/UL
EOSINOPHIL NFR BLD: 4 %
ERYTHROCYTE [DISTWIDTH] IN BLOOD BY AUTOMATED COUNT: 14.4 %
EST. GFR  (AFRICAN AMERICAN): >60 ML/MIN/1.73 M^2
EST. GFR  (NON AFRICAN AMERICAN): >60 ML/MIN/1.73 M^2
ESTIMATED AVG GLUCOSE: 212 MG/DL
GLUCOSE SERPL-MCNC: 178 MG/DL
HBA1C MFR BLD HPLC: 9 %
HCT VFR BLD AUTO: 41 %
HDLC SERPL-MCNC: 23 MG/DL
HDLC SERPL: 8.4 %
HGB BLD-MCNC: 13.4 G/DL
LDLC SERPL CALC-MCNC: ABNORMAL MG/DL
LYMPHOCYTES # BLD AUTO: 1.7 K/UL
LYMPHOCYTES NFR BLD: 41.4 %
MCH RBC QN AUTO: 27.9 PG
MCHC RBC AUTO-ENTMCNC: 32.7 G/DL
MCV RBC AUTO: 85 FL
MONOCYTES # BLD AUTO: 0.4 K/UL
MONOCYTES NFR BLD: 9.8 %
NEUTROPHILS # BLD AUTO: 1.9 K/UL
NEUTROPHILS NFR BLD: 44.1 %
NONHDLC SERPL-MCNC: 252 MG/DL
NRBC BLD-RTO: 0 /100 WBC
PLATELET # BLD AUTO: 118 K/UL
PMV BLD AUTO: 12.8 FL
POTASSIUM SERPL-SCNC: 4 MMOL/L
PROT SERPL-MCNC: 8.1 G/DL
RBC # BLD AUTO: 4.8 M/UL
SODIUM SERPL-SCNC: 137 MMOL/L
TRIGL SERPL-MCNC: 1136 MG/DL
WBC # BLD AUTO: 4.2 K/UL

## 2018-10-11 PROCEDURE — 85025 COMPLETE CBC W/AUTO DIFF WBC: CPT

## 2018-10-11 PROCEDURE — 80053 COMPREHEN METABOLIC PANEL: CPT

## 2018-10-11 PROCEDURE — 36415 COLL VENOUS BLD VENIPUNCTURE: CPT

## 2018-10-11 PROCEDURE — 83036 HEMOGLOBIN GLYCOSYLATED A1C: CPT

## 2018-10-11 PROCEDURE — 80061 LIPID PANEL: CPT

## 2018-10-15 ENCOUNTER — OFFICE VISIT (OUTPATIENT)
Dept: CARDIOLOGY | Facility: CLINIC | Age: 48
End: 2018-10-15
Payer: COMMERCIAL

## 2018-10-15 VITALS
BODY MASS INDEX: 38.68 KG/M2 | HEART RATE: 86 BPM | DIASTOLIC BLOOD PRESSURE: 102 MMHG | SYSTOLIC BLOOD PRESSURE: 152 MMHG | WEIGHT: 291.88 LBS | HEIGHT: 73 IN

## 2018-10-15 DIAGNOSIS — R29.898 LEFT ARM WEAKNESS: ICD-10-CM

## 2018-10-15 DIAGNOSIS — R94.31 ABNORMAL EKG: ICD-10-CM

## 2018-10-15 DIAGNOSIS — E11.42 TYPE 2 DIABETES MELLITUS WITH DIABETIC POLYNEUROPATHY, WITHOUT LONG-TERM CURRENT USE OF INSULIN: Primary | ICD-10-CM

## 2018-10-15 DIAGNOSIS — E78.1 HYPERTRIGLYCERIDEMIA: ICD-10-CM

## 2018-10-15 PROCEDURE — 99204 OFFICE O/P NEW MOD 45 MIN: CPT | Mod: S$GLB,,, | Performed by: INTERNAL MEDICINE

## 2018-10-15 PROCEDURE — 3008F BODY MASS INDEX DOCD: CPT | Mod: CPTII,S$GLB,, | Performed by: INTERNAL MEDICINE

## 2018-10-15 PROCEDURE — 3045F PR MOST RECENT HEMOGLOBIN A1C LEVEL 7.0-9.0%: CPT | Mod: CPTII,S$GLB,, | Performed by: INTERNAL MEDICINE

## 2018-10-15 PROCEDURE — 99999 PR PBB SHADOW E&M-EST. PATIENT-LVL III: CPT | Mod: PBBFAC,,, | Performed by: INTERNAL MEDICINE

## 2018-10-15 RX ORDER — EZETIMIBE 10 MG/1
10 TABLET ORAL DAILY
Qty: 90 TABLET | Refills: 3 | Status: SHIPPED | OUTPATIENT
Start: 2018-10-15 | End: 2019-02-04

## 2018-10-15 RX ORDER — LISINOPRIL 5 MG/1
5 TABLET ORAL DAILY
Qty: 90 TABLET | Refills: 3 | Status: SHIPPED | OUTPATIENT
Start: 2018-10-15 | End: 2019-04-16

## 2018-10-15 NOTE — LETTER
October 15, 2018      Alecia Carter MD  [address]  [facility] [address]           [facility] - Cardiology  [address] [facility] [address]  Phone: [phone]          Patient: Jesusita De La Cruz   MR Number: [id number]   YOB: 1970   Date of Visit: 10/15/2018       Dear Dr. Alecia Carter:    Thank you for referring Jesusita De La Cruz to me for evaluation. Attached you will find relevant portions of my assessment and plan of care.    If you have questions, please do not hesitate to call me. I look forward to following Jesusita De La Cruz along with you.    Sincerely,    Vincent Barriga III, MD    Enclosure  CC:  No Recipients    If you would like to receive this communication electronically, please contact [email or web address] or [phone] to request more information on [employer] Link access.    For providers and/or their staff who would like to refer a patient to [facility], please contact us through our one-stop-shop provider referral line, [facility], at [phone].    If you feel you have received this communication in error or would no longer like to receive these types of communications, please e-mail [email or web address]

## 2018-10-15 NOTE — PROGRESS NOTES
"     Cardiology Clinic Note  Reason for Visit: L arm weakness, abnormal EKG    HPI:     Jesusita De La Cruz is a 48 y.o. M with uncontrolled diabetes, who was referred for left arm weakness and abnormal EKG.    He reports that he recently lost his mother and brother ~2 weeks ago. Both were chronically ill and gradually declined prior to their deaths. Since that time, there have been difficult family dynamics and feeling of being overwhelmed and anxiety. When thinking of the recent events and family stressors, he reports having left arm weakness, bilateral leg numbness and tingling, and heart rate increasing. No chest pain or dyspnea. He does not have these symptoms outside of experiencing these stressors.    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 His fasting triglycerides were >2000 one year ago, most recently ~1200. He reports frequently eating fast food (popeyes) due to limited time. No alcohol. A1c of 9. He does not have a PCP.    Prior cardiovascular issues:  None    ROS:    Constitution: Negative for fever or chills.  HENT: Negative for  headaches.  Eyes: Negative for blurred vision.   Cardiovascular: See above  Pulmonary: Negative for SOB. Negative for cough.   Gastrointestinal: Negative for nausea/vomiting.   : Negative for dysuria.   Skin: Negative for rashes.  Neurological: Negative for focal weakness.  Psychological: Negative for depression.  PMH:     Past Medical History:   Diagnosis Date    Diabetes mellitus     Diabetes mellitus, type 2     High cholesterol      No past surgical history on file.  Allergies:   Review of patient's allergies indicates:  No Known Allergies  Medications: "     Current Outpatient Medications on File Prior to Visit   Medication Sig Dispense Refill    aspirin (ECOTRIN) 81 MG EC tablet Take 81 mg by mouth once daily.      atorvastatin (LIPITOR) 40 MG tablet Take 1 tablet (40 mg total) by mouth once daily. 90 tablet 11    blood sugar diagnostic Strp 1 each by Misc.(Non-Drug; Combo Route) route 3 (three) times daily. 100 each 11    blood-glucose meter kit Use as instructed 1 each 0    ciclopirox (LOPROX) 0.77 % Crea Apply topically 2 (two) times daily. 90 g 2    ciclopirox (PENLAC) 8 % Soln Apply topically nightly. 6.6 mL 11    econazole nitrate 1 % cream Apply topically 2 (two) times daily. 30 g 1    ergocalciferol (ERGOCALCIFEROL) 50,000 unit Cap Take 1 capsule (50,000 Units total) by mouth twice a week. 25 capsule 4    fenofibric acid (FIBRICOR) 135 mg CpDR TAKE 1 CAPSULE(135 MG) BY MOUTH EVERY DAY AT 6 AM 30 capsule 2    lancets Misc 1 each by Misc.(Non-Drug; Combo Route) route 3 (three) times daily. 100 each 11    lisinopril (PRINIVIL,ZESTRIL) 5 MG tablet Take 1 tablet (5 mg total) by mouth once daily. 90 tablet 11    metformin (GLUCOPHAGE) 500 MG tablet Take 1 tablet (500 mg total) by mouth 2 (two) times daily with meals. 180 tablet 3     No current facility-administered medications on file prior to visit.      Social History:     Social History     Tobacco Use    Smoking status: Former Smoker     Packs/day: 1.00     Years: 30.00     Pack years: 30.00     Types: Cigarettes     Last attempt to quit:      Years since quittin.7    Smokeless tobacco: Never Used   Substance Use Topics    Alcohol use: Yes     Comment: rare     Family History:     Family History   Problem Relation Age of Onset    Dementia Mother     Hyperlipidemia Mother     Alzheimer's disease Mother     Heart disease Mother     Heart failure Father     Heart disease Father         CHF    Hyperlipidemia Brother     Diabetes Brother      Physical Exam:   BP (!) 152/102   Pulse  "86   Ht 6' 1" (1.854 m)   Wt 132.4 kg (291 lb 14.2 oz)   BMI 38.51 kg/m²      Constitutional: No apparent distress, conversant  HEENT: Sclera anicteric, extraocular movements intact  Neck: No jugular venous distension, no carotid bruits  CV: Regular rate and rhythm, no murmurs rubs or gallops, normal S1/S2  Pulm: Clear to auscultation bilaterally  GI: Abdomen soft, no palpable masses  Extremities: No lower extremity edema, warm with palpable pulses  Skin: No ecchymosis, erythema, or ulcers  Psych: Alert and oriented to person place location, appropriate affect  Neuro: No focal deficits    Labs:     Blood Tests:  Lab Results   Component Value Date     10/11/2018    K 4.0 10/11/2018     10/11/2018    CO2 24 10/11/2018    BUN 11 10/11/2018    CREATININE 1.0 10/11/2018     (H) 10/11/2018    HGBA1C 9.0 (H) 10/11/2018    MG 1.9 05/21/2017    AST 20 10/11/2018    ALT 24 10/11/2018    ALBUMIN 3.9 10/11/2018    PROT 8.1 10/11/2018    BILITOT 0.9 10/11/2018    WBC 4.20 10/11/2018    HGB 13.4 (L) 10/11/2018    HCT 41.0 10/11/2018    MCV 85 10/11/2018     (L) 10/11/2018    INR 1.0 05/21/2017    TSH 1.741 05/24/2017       Lab Results   Component Value Date    CHOL 275 (H) 10/11/2018    HDL 23 (L) 10/11/2018    TRIG 1,136 (H) 10/11/2018       Lab Results   Component Value Date    LDLCALC Invalid, Trig>400.0 10/11/2018       Urine Tests:  Lab Results   Component Value Date    CREATRANDUR 144.0 05/23/2017       Imaging:     Echocardiogram  None    Stress testing  HAYLEY 5/24/17  CONCLUSIONS     1 - Normal left ventricular systolic function (EF 60-65%).     2 - No wall motion abnormalities.     3 - Normal left ventricular diastolic function.     4 - Normal right ventricular systolic function .     No evidence of stress induced myocardial ischemia.     Cath Lab  None    Other  None    EKG:   10/10/18 - NSR, early repolarization, normal variant    Assessment:     1. Type 2 diabetes mellitus with diabetic " polyneuropathy, without long-term current use of insulin    2. Left arm weakness    3. Abnormal EKG    4. Hypertriglyceridemia        Plan:     Hypertriglyceridemia  Fasting TG 1136 on 10/11/18. Educated on low triglyceride diet. He has follow up with a nutritionist. Will start on zetia 10mg daily. Will recheck lipid profile and LFTs in 2 months. If still elevated (or LDL high), will start on high intensity rosuvastatin.    Abnormal EKG  His EKG is unchanged from that in May 2018. It shows sinus rhythm with early repolarization changes. No further evaluation.    Type 2 diabetes mellitus with diabetic polyneuropathy, without long-term current use of insulin  Uncontrolled with A1c 9.0% on metformin. Will refer to establish with PCP.  His recent clinic BPs on 10/10/18 and 7/14/17 show BP 130s/80s. He has proteinuria on urine studies. Will start on lisinopril 5mg daily and up-titrate as needed for BP control.    Left arm weakness, heart racing, anxiety  Situational given recent loss of family members and family difficulties. No acute cardiac problems. To establish care with PCP.    Signed:  Eron Barriga MD  Cardiology     10/15/2018 11:07 AM    Follow-up:     Future Appointments   Date Time Provider Department Center   10/15/2018 10:00 AM Vincent Barriag III, MD [facility] CARDIO [place]

## 2019-02-04 ENCOUNTER — PATIENT MESSAGE (OUTPATIENT)
Dept: CARDIOLOGY | Facility: CLINIC | Age: 49
End: 2019-02-04

## 2019-02-04 RX ORDER — FENOFIBRATE 160 MG/1
160 TABLET ORAL DAILY
Qty: 90 TABLET | Refills: 3 | Status: SHIPPED | OUTPATIENT
Start: 2019-02-04 | End: 2019-04-16 | Stop reason: SDUPTHER

## 2019-04-15 ENCOUNTER — PATIENT OUTREACH (OUTPATIENT)
Dept: ADMINISTRATIVE | Facility: HOSPITAL | Age: 49
End: 2019-04-15

## 2019-04-15 NOTE — PROGRESS NOTES
Attempted outreach to pt to remind him of appt w/PCP tmrw and to discuss diabetic eye exam & labs. No answer, left voice mail for return call.     Chart review also completed. No history of pt in Links, HM modifiers updated, HM duplicate entries deleted, care team updated, old orders deleted.

## 2019-04-16 ENCOUNTER — IMMUNIZATION (OUTPATIENT)
Dept: PHARMACY | Facility: CLINIC | Age: 49
End: 2019-04-16

## 2019-04-16 ENCOUNTER — OFFICE VISIT (OUTPATIENT)
Dept: INTERNAL MEDICINE | Facility: CLINIC | Age: 49
End: 2019-04-16
Payer: COMMERCIAL

## 2019-04-16 ENCOUNTER — IMMUNIZATION (OUTPATIENT)
Dept: PHARMACY | Facility: CLINIC | Age: 49
End: 2019-04-16
Payer: COMMERCIAL

## 2019-04-16 ENCOUNTER — PATIENT MESSAGE (OUTPATIENT)
Dept: INTERNAL MEDICINE | Facility: CLINIC | Age: 49
End: 2019-04-16

## 2019-04-16 ENCOUNTER — LAB VISIT (OUTPATIENT)
Dept: LAB | Facility: HOSPITAL | Age: 49
End: 2019-04-16
Attending: INTERNAL MEDICINE
Payer: COMMERCIAL

## 2019-04-16 VITALS
SYSTOLIC BLOOD PRESSURE: 156 MMHG | HEIGHT: 73 IN | HEART RATE: 75 BPM | DIASTOLIC BLOOD PRESSURE: 100 MMHG | BODY MASS INDEX: 38.57 KG/M2 | OXYGEN SATURATION: 96 % | WEIGHT: 291 LBS

## 2019-04-16 DIAGNOSIS — E11.9 TYPE 2 DIABETES MELLITUS WITHOUT COMPLICATION, WITHOUT LONG-TERM CURRENT USE OF INSULIN: Primary | ICD-10-CM

## 2019-04-16 DIAGNOSIS — E11.9 TYPE 2 DIABETES MELLITUS WITHOUT COMPLICATION, WITHOUT LONG-TERM CURRENT USE OF INSULIN: ICD-10-CM

## 2019-04-16 DIAGNOSIS — I10 ESSENTIAL HYPERTENSION: ICD-10-CM

## 2019-04-16 DIAGNOSIS — E78.1 HYPERTRIGLYCERIDEMIA: ICD-10-CM

## 2019-04-16 DIAGNOSIS — E11.42 TYPE 2 DIABETES MELLITUS WITH DIABETIC POLYNEUROPATHY, WITHOUT LONG-TERM CURRENT USE OF INSULIN: ICD-10-CM

## 2019-04-16 LAB
ALBUMIN SERPL BCP-MCNC: 4.1 G/DL (ref 3.5–5.2)
ALP SERPL-CCNC: 64 U/L (ref 55–135)
ALT SERPL W/O P-5'-P-CCNC: 20 U/L (ref 10–44)
ANION GAP SERPL CALC-SCNC: 15 MMOL/L (ref 8–16)
AST SERPL-CCNC: 15 U/L (ref 10–40)
BILIRUB SERPL-MCNC: 0.7 MG/DL (ref 0.1–1)
BUN SERPL-MCNC: 10 MG/DL (ref 6–20)
CALCIUM SERPL-MCNC: 9.9 MG/DL (ref 8.7–10.5)
CHLORIDE SERPL-SCNC: 102 MMOL/L (ref 95–110)
CHOLEST SERPL-MCNC: 260 MG/DL (ref 120–199)
CHOLEST/HDLC SERPL: 12.4 {RATIO} (ref 2–5)
CO2 SERPL-SCNC: 23 MMOL/L (ref 23–29)
CREAT SERPL-MCNC: 1 MG/DL (ref 0.5–1.4)
EST. GFR  (AFRICAN AMERICAN): >60 ML/MIN/1.73 M^2
EST. GFR  (NON AFRICAN AMERICAN): >60 ML/MIN/1.73 M^2
ESTIMATED AVG GLUCOSE: 206 MG/DL (ref 68–131)
GLUCOSE SERPL-MCNC: 179 MG/DL (ref 70–110)
HBA1C MFR BLD HPLC: 8.8 % (ref 4–5.6)
HDLC SERPL-MCNC: 21 MG/DL (ref 40–75)
HDLC SERPL: 8.1 % (ref 20–50)
LDLC SERPL CALC-MCNC: ABNORMAL MG/DL (ref 63–159)
NONHDLC SERPL-MCNC: 239 MG/DL
POTASSIUM SERPL-SCNC: 4.1 MMOL/L (ref 3.5–5.1)
PROT SERPL-MCNC: 8.5 G/DL (ref 6–8.4)
SODIUM SERPL-SCNC: 140 MMOL/L (ref 136–145)
TRIGL SERPL-MCNC: 1372 MG/DL (ref 30–150)

## 2019-04-16 PROCEDURE — 3080F DIAST BP >= 90 MM HG: CPT | Mod: CPTII,S$GLB,, | Performed by: INTERNAL MEDICINE

## 2019-04-16 PROCEDURE — 36415 COLL VENOUS BLD VENIPUNCTURE: CPT

## 2019-04-16 PROCEDURE — 3008F BODY MASS INDEX DOCD: CPT | Mod: CPTII,S$GLB,, | Performed by: INTERNAL MEDICINE

## 2019-04-16 PROCEDURE — 99999 PR PBB SHADOW E&M-EST. PATIENT-LVL IV: ICD-10-PCS | Mod: PBBFAC,,, | Performed by: INTERNAL MEDICINE

## 2019-04-16 PROCEDURE — 3077F SYST BP >= 140 MM HG: CPT | Mod: CPTII,S$GLB,, | Performed by: INTERNAL MEDICINE

## 2019-04-16 PROCEDURE — 99999 PR PBB SHADOW E&M-EST. PATIENT-LVL IV: CPT | Mod: PBBFAC,,, | Performed by: INTERNAL MEDICINE

## 2019-04-16 PROCEDURE — 3008F PR BODY MASS INDEX (BMI) DOCUMENTED: ICD-10-PCS | Mod: CPTII,S$GLB,, | Performed by: INTERNAL MEDICINE

## 2019-04-16 PROCEDURE — 80053 COMPREHEN METABOLIC PANEL: CPT

## 2019-04-16 PROCEDURE — 3045F PR MOST RECENT HEMOGLOBIN A1C LEVEL 7.0-9.0%: ICD-10-PCS | Mod: CPTII,S$GLB,, | Performed by: INTERNAL MEDICINE

## 2019-04-16 PROCEDURE — 3077F PR MOST RECENT SYSTOLIC BLOOD PRESSURE >= 140 MM HG: ICD-10-PCS | Mod: CPTII,S$GLB,, | Performed by: INTERNAL MEDICINE

## 2019-04-16 PROCEDURE — 83036 HEMOGLOBIN GLYCOSYLATED A1C: CPT

## 2019-04-16 PROCEDURE — 99214 PR OFFICE/OUTPT VISIT, EST, LEVL IV, 30-39 MIN: ICD-10-PCS | Mod: S$GLB,,, | Performed by: INTERNAL MEDICINE

## 2019-04-16 PROCEDURE — 80061 LIPID PANEL: CPT

## 2019-04-16 PROCEDURE — 3080F PR MOST RECENT DIASTOLIC BLOOD PRESSURE >= 90 MM HG: ICD-10-PCS | Mod: CPTII,S$GLB,, | Performed by: INTERNAL MEDICINE

## 2019-04-16 PROCEDURE — 99214 OFFICE O/P EST MOD 30 MIN: CPT | Mod: S$GLB,,, | Performed by: INTERNAL MEDICINE

## 2019-04-16 PROCEDURE — 3045F PR MOST RECENT HEMOGLOBIN A1C LEVEL 7.0-9.0%: CPT | Mod: CPTII,S$GLB,, | Performed by: INTERNAL MEDICINE

## 2019-04-16 RX ORDER — METFORMIN HYDROCHLORIDE 500 MG/1
500 TABLET ORAL 2 TIMES DAILY WITH MEALS
Qty: 180 TABLET | Refills: 11 | Status: SHIPPED | OUTPATIENT
Start: 2019-04-16 | End: 2020-05-22

## 2019-04-16 RX ORDER — LISINOPRIL 20 MG/1
20 TABLET ORAL DAILY
Qty: 90 TABLET | Refills: 11 | Status: SHIPPED | OUTPATIENT
Start: 2019-04-16 | End: 2020-05-22

## 2019-04-16 RX ORDER — FENOFIBRATE 160 MG/1
160 TABLET ORAL DAILY
Qty: 90 TABLET | Refills: 3 | Status: SHIPPED | OUTPATIENT
Start: 2019-04-16 | End: 2020-05-22

## 2019-04-16 NOTE — MEDICAL/APP STUDENT
Subjective:       Patient ID: Jesusita De La Cruz is a 49 y.o. male.    Chief Complaint: Annual Exam   50y/o M presenting to establish care. No complaints at this time. Pt denies any polyuria/polyphasia/polydipsia, neuropathy, vision changes, or weight changes. Feels he is tolerating his Metformin well, however goes have an occasional sensation of gas. Pt is noncompliant with his lisinopril and fenofibrate as he feels these cause fatigue and chest cramps when taken together. He states he was never instructed on how to best take these medications and feels uncomfortable taking them together. He was referred to cardiology in Oct 2018 for chest pain and left arms weakness which was attributed to anxiety of the passing of his mother and brother. Pt feels these symptoms have now resolved. States he is coping well and has a good support network with his wife. Pt has not been checking his BGL at home as he is unsure how to use the glucometer.       Review of Systems   Constitutional: Negative for activity change, appetite change, chills and fatigue.   HENT: Positive for hearing loss. Negative for congestion.    Respiratory: Negative for chest tightness and shortness of breath.    Gastrointestinal: Negative for abdominal distention and abdominal pain.        Gas pain   Endocrine: Negative for polydipsia, polyphagia and polyuria.   Genitourinary: Negative for decreased urine volume, difficulty urinating, hematuria and urgency.   Skin: Negative for color change.   Neurological: Negative for syncope, weakness, light-headedness, numbness and headaches.   Hematological: Negative for adenopathy.       Objective:      Physical Exam   Constitutional: He appears well-developed and well-nourished.   HENT:   Head: Normocephalic and atraumatic.   Right Ear: Tympanic membrane, external ear and ear canal normal.   Left Ear: External ear normal.   Mouth/Throat: Oropharynx is clear and moist.   Impacted cerumen on left ear   Eyes: No scleral  icterus.   Neck: Normal range of motion. Neck supple.   Acanthosis nigricans on posterior neck   Cardiovascular: Normal rate, regular rhythm and normal heart sounds. Exam reveals no gallop and no friction rub.   No murmur heard.  Pulses:       Dorsalis pedis pulses are 2+ on the right side, and 2+ on the left side.        Posterior tibial pulses are 2+ on the right side, and 2+ on the left side.   Pulmonary/Chest: Effort normal and breath sounds normal.   Abdominal: Soft. Bowel sounds are normal.   Musculoskeletal: He exhibits no edema.   Feet:   Right Foot:   Protective Sensation: 5 sites tested.   Skin Integrity: Negative for ulcer or skin breakdown.   Left Foot:   Protective Sensation: 5 sites tested.   Skin Integrity: Negative for ulcer or skin breakdown.   Lymphadenopathy:     He has no cervical adenopathy.   Neurological: He is alert.   Skin: Skin is warm and dry. Capillary refill takes less than 2 seconds.   onychomycotic nails bilateral feet   Psychiatric: He has a normal mood and affect.   Nursing note and vitals reviewed.      Assessment:       1. Type 2 diabetes mellitus without complication, without long-term current use of insulin    2. Type 2 diabetes mellitus with diabetic polyneuropathy, without long-term current use of insulin    3. Hypertriglyceridemia    4. Essential hypertension        Plan:     T2DM:  CMP: assess renal function and LFT's since pt is on fenofibrate  HA1c:Continue Metofrmin 500mg BID  Diabetic counseling: Pt has not been taking his home BGL as he was unsure how to use the glucometer  Retinal Imaging: pt uncomfortable with financial burden of optometry referral, will continue with retinal imaging unless otherwise indicated    Essential Hypertension:  Pt feels he is comfortable taking Lisinopril now that he was instructed on side effect profile  Lisinopril 20mg    Hypertriglyceridemia:  Lipid Panel

## 2019-04-16 NOTE — PROGRESS NOTES
Subjective:       Patient ID: Jesusita De La Cruz is a 49 y.o. male.    Chief Complaint: Annual Exam    Patient is here for followup for chronic conditions.    DM2:  Low med adherence  somewhat changed Diet  n/a 140 AM sugars  n/a Post-prandial sugars  Does not know how to chk  denies Numbness in feet  no sores in feet  no Visual changes  no Polyuria/polydipsia.        Review of Systems   Constitutional: Negative for activity change and unexpected weight change.   HENT: Positive for hearing loss (L>R). Negative for rhinorrhea and trouble swallowing.    Eyes: Negative for discharge and visual disturbance.   Respiratory: Negative for chest tightness and wheezing.    Cardiovascular: Negative for chest pain and palpitations.   Gastrointestinal: Negative for blood in stool, constipation, diarrhea and vomiting.   Endocrine: Negative for polydipsia and polyuria.   Genitourinary: Negative for difficulty urinating, hematuria and urgency.   Musculoskeletal: Negative for arthralgias, joint swelling and neck pain.   Neurological: Negative for weakness and headaches.   Psychiatric/Behavioral: Negative for confusion and dysphoric mood.       Objective:      Physical Exam   Constitutional: He is oriented to person, place, and time. He appears well-developed and well-nourished. No distress.   HENT:   Head: Normocephalic and atraumatic.   L ext ear canal cerumen filled, R is clear   Eyes: No scleral icterus.   Neck: Normal range of motion. No thyromegaly present.   Cardiovascular: Normal rate, regular rhythm and normal heart sounds. Exam reveals no gallop and no friction rub.   No murmur heard.  Pulmonary/Chest: Effort normal and breath sounds normal. No respiratory distress. He has no wheezes. He has no rales.   Abdominal: Soft. Bowel sounds are normal. He exhibits no distension and no mass. There is no tenderness. There is no rebound and no guarding.   Musculoskeletal: Normal range of motion. He exhibits no edema.   Protective  Sensation (w/ 10 gram monofilament):  Right: Intact  Left: Intact    Visual Inspection:  Skin Breakdown -  Bilateral with tinea and onychomycotic nails    Pedal Pulses:   Right: normal  Left: normal    Posterior tibialis:   Right:normal  Left: normal     Lymphadenopathy:     He has no cervical adenopathy.   Neurological: He is alert and oriented to person, place, and time.   Skin: No lesion noted.   Psychiatric: He has a normal mood and affect. Thought content normal.       Assessment:       1. Type 2 diabetes mellitus without complication, without long-term current use of insulin    2. Type 2 diabetes mellitus with diabetic polyneuropathy, without long-term current use of insulin    3. Hypertriglyceridemia    4. Essential hypertension        Plan:       Jesusita was seen today for annual exam.    Diagnoses and all orders for this visit:    Type 2 diabetes mellitus without complication, without long-term current use of insulin  -     Comprehensive metabolic panel; Future  -     Lipid panel; Future  -     Hemoglobin A1c; Future  -     Ambulatory Referral to Diabetes Education  -     Diabetic Eye Screening Photo; Future    Type 2 diabetes mellitus with diabetic polyneuropathy, without long-term current use of insulin  -     metFORMIN (GLUCOPHAGE) 500 MG tablet; Take 1 tablet (500 mg total) by mouth 2 (two) times daily with meals.    Hypertriglyceridemia  -     fenofibrate 160 MG Tab; Take 1 tablet (160 mg total) by mouth once daily.    Essential hypertension  -     lisinopril (PRINIVIL,ZESTRIL) 20 MG tablet; Take 1 tablet (20 mg total) by mouth once daily.    Lengthy discussion re importance of med adherence, need for diet counseling and sugar monitoring.    Mild tinea, no need treatment.        Health Maintenance       Date Due Completion Date    TETANUS VACCINE 01/29/1990 ---    Pneumococcal Vaccine (Medium Risk) (1 of 1 - PPSV23) 01/29/1991 ---    Low Dose Statin 01/29/1993 ---    Eye Exam 05/31/2018 5/31/2017     Foot Exam 07/14/2018 7/14/2017    Influenza Vaccine 08/01/2018 ---    Hemoglobin A1c 01/11/2019 10/11/2018    Lipid Panel 10/11/2019 10/11/2018      Needs statin but I do not want to overwhelm him.    Follow up in about 3 months (around 7/16/2019).    Future Appointments   Date Time Provider Department Center   5/10/2019  8:30 AM DIABETES EDUCATOR, INT MED 2 [facility]   5/10/2019  9:30 AM EYECAM, DIABETES [facility]   7/18/2019  8:20 AM Jonathan Boateng MD [facility]

## 2020-05-22 DIAGNOSIS — E11.42 TYPE 2 DIABETES MELLITUS WITH DIABETIC POLYNEUROPATHY, WITHOUT LONG-TERM CURRENT USE OF INSULIN: ICD-10-CM

## 2020-05-22 DIAGNOSIS — E78.1 HYPERTRIGLYCERIDEMIA: ICD-10-CM

## 2020-05-22 DIAGNOSIS — I10 ESSENTIAL HYPERTENSION: ICD-10-CM

## 2020-05-22 RX ORDER — METFORMIN HYDROCHLORIDE 500 MG/1
TABLET ORAL
Qty: 180 TABLET | Refills: 0 | Status: SHIPPED | OUTPATIENT
Start: 2020-05-22 | End: 2021-02-19

## 2020-05-22 RX ORDER — FENOFIBRATE 160 MG/1
TABLET ORAL
Qty: 90 TABLET | Refills: 0 | Status: SHIPPED | OUTPATIENT
Start: 2020-05-22 | End: 2021-02-19

## 2020-05-22 RX ORDER — METFORMIN HYDROCHLORIDE 500 MG/1
TABLET ORAL
Qty: 180 TABLET | Refills: 11 | Status: SHIPPED | OUTPATIENT
Start: 2020-05-22 | End: 2020-05-22

## 2020-05-22 RX ORDER — LISINOPRIL 20 MG/1
TABLET ORAL
Qty: 90 TABLET | Refills: 11 | Status: SHIPPED | OUTPATIENT
Start: 2020-05-22 | End: 2020-05-22

## 2020-05-22 RX ORDER — LISINOPRIL 20 MG/1
TABLET ORAL
Qty: 90 TABLET | Refills: 0 | Status: SHIPPED | OUTPATIENT
Start: 2020-05-22 | End: 2021-02-15 | Stop reason: SDUPTHER

## 2020-05-22 NOTE — TELEPHONE ENCOUNTER
Hi, please call patient --  I will send in one more prescription of this medicine, but I would need to see patient back for any further prescriptions.  Thank you, Jonathan Boateng

## 2020-08-12 ENCOUNTER — PATIENT OUTREACH (OUTPATIENT)
Dept: ADMINISTRATIVE | Facility: HOSPITAL | Age: 50
End: 2020-08-12

## 2020-08-12 NOTE — PROGRESS NOTES
Health Maintenance Due   Topic Date Due    Hepatitis C Screening  1970    HIV Screening  01/29/1987    Low Dose Statin  01/29/1993    Eye Exam  07/14/2018    Hemoglobin A1c  07/16/2019    Foot Exam  04/16/2020    Lipid Panel  04/16/2020     Chart review completed.

## 2020-10-13 ENCOUNTER — TELEPHONE (OUTPATIENT)
Dept: INTERNAL MEDICINE | Facility: CLINIC | Age: 50
End: 2020-10-13

## 2020-10-13 NOTE — TELEPHONE ENCOUNTER
VM left to send a recent home blood press to his [facility] if available and or call to schedule a NV B/P Check and he needs to establish care.

## 2021-02-15 ENCOUNTER — OFFICE VISIT (OUTPATIENT)
Dept: INTERNAL MEDICINE | Facility: CLINIC | Age: 51
End: 2021-02-15
Payer: COMMERCIAL

## 2021-02-15 ENCOUNTER — LAB VISIT (OUTPATIENT)
Dept: LAB | Facility: HOSPITAL | Age: 51
End: 2021-02-15
Attending: INTERNAL MEDICINE
Payer: COMMERCIAL

## 2021-02-15 VITALS
DIASTOLIC BLOOD PRESSURE: 84 MMHG | HEIGHT: 73 IN | WEIGHT: 283.94 LBS | OXYGEN SATURATION: 98 % | BODY MASS INDEX: 37.63 KG/M2 | HEART RATE: 97 BPM | SYSTOLIC BLOOD PRESSURE: 134 MMHG

## 2021-02-15 DIAGNOSIS — E11.42 TYPE 2 DIABETES MELLITUS WITH DIABETIC POLYNEUROPATHY, WITHOUT LONG-TERM CURRENT USE OF INSULIN: ICD-10-CM

## 2021-02-15 DIAGNOSIS — I10 ESSENTIAL HYPERTENSION: ICD-10-CM

## 2021-02-15 DIAGNOSIS — Z00.00 ROUTINE GENERAL MEDICAL EXAMINATION AT A HEALTH CARE FACILITY: ICD-10-CM

## 2021-02-15 DIAGNOSIS — H90.0 CONDUCTIVE HEARING LOSS, BILATERAL: ICD-10-CM

## 2021-02-15 DIAGNOSIS — Z00.00 ROUTINE GENERAL MEDICAL EXAMINATION AT A HEALTH CARE FACILITY: Primary | ICD-10-CM

## 2021-02-15 LAB
ALBUMIN SERPL BCP-MCNC: 4.5 G/DL (ref 3.5–5.2)
ALBUMIN/CREAT UR: 760.2 UG/MG (ref 0–30)
ALP SERPL-CCNC: 83 U/L (ref 55–135)
ALT SERPL W/O P-5'-P-CCNC: 25 U/L (ref 10–44)
ANION GAP SERPL CALC-SCNC: 15 MMOL/L (ref 8–16)
AST SERPL-CCNC: 18 U/L (ref 10–40)
BASOPHILS # BLD AUTO: 0.04 K/UL (ref 0–0.2)
BASOPHILS NFR BLD: 0.6 % (ref 0–1.9)
BILIRUB SERPL-MCNC: 0.5 MG/DL (ref 0.1–1)
BUN SERPL-MCNC: 18 MG/DL (ref 6–20)
CALCIUM SERPL-MCNC: 9.8 MG/DL (ref 8.7–10.5)
CHLORIDE SERPL-SCNC: 101 MMOL/L (ref 95–110)
CHOLEST SERPL-MCNC: 699 MG/DL (ref 120–199)
CHOLEST/HDLC SERPL: 63.5 {RATIO} (ref 2–5)
CO2 SERPL-SCNC: 21 MMOL/L (ref 23–29)
CREAT SERPL-MCNC: 1.3 MG/DL (ref 0.5–1.4)
CREAT UR-MCNC: 98 MG/DL (ref 23–375)
DIFFERENTIAL METHOD: ABNORMAL
EOSINOPHIL # BLD AUTO: 0.3 K/UL (ref 0–0.5)
EOSINOPHIL NFR BLD: 5.1 % (ref 0–8)
ERYTHROCYTE [DISTWIDTH] IN BLOOD BY AUTOMATED COUNT: 13.2 % (ref 11.5–14.5)
EST. GFR  (AFRICAN AMERICAN): >60 ML/MIN/1.73 M^2
EST. GFR  (NON AFRICAN AMERICAN): >60 ML/MIN/1.73 M^2
ESTIMATED AVG GLUCOSE: 220 MG/DL (ref 68–131)
GLUCOSE SERPL-MCNC: 355 MG/DL (ref 70–110)
HBA1C MFR BLD: 9.3 % (ref 4–5.6)
HCT VFR BLD AUTO: 42.1 % (ref 40–54)
HDLC SERPL-MCNC: 11 MG/DL (ref 40–75)
HDLC SERPL: 1.6 % (ref 20–50)
HGB BLD-MCNC: 15.4 G/DL (ref 14–18)
IMM GRANULOCYTES # BLD AUTO: 0.04 K/UL (ref 0–0.04)
IMM GRANULOCYTES NFR BLD AUTO: 0.6 % (ref 0–0.5)
LDLC SERPL CALC-MCNC: ABNORMAL MG/DL (ref 63–159)
LYMPHOCYTES # BLD AUTO: 2.3 K/UL (ref 1–4.8)
LYMPHOCYTES NFR BLD: 36.2 % (ref 18–48)
MCH RBC QN AUTO: 31.8 PG (ref 27–31)
MCHC RBC AUTO-ENTMCNC: 36.6 G/DL (ref 32–36)
MCV RBC AUTO: 87 FL (ref 82–98)
MICROALBUMIN UR DL<=1MG/L-MCNC: 745 UG/ML
MONOCYTES # BLD AUTO: 0.5 K/UL (ref 0.3–1)
MONOCYTES NFR BLD: 7.4 % (ref 4–15)
NEUTROPHILS # BLD AUTO: 3.1 K/UL (ref 1.8–7.7)
NEUTROPHILS NFR BLD: 50.1 % (ref 38–73)
NONHDLC SERPL-MCNC: 688 MG/DL
NRBC BLD-RTO: 0 /100 WBC
PLATELET # BLD AUTO: 174 K/UL (ref 150–350)
PMV BLD AUTO: 13.6 FL (ref 9.2–12.9)
POTASSIUM SERPL-SCNC: 4.1 MMOL/L (ref 3.5–5.1)
PROT SERPL-MCNC: 8.7 G/DL (ref 6–8.4)
RBC # BLD AUTO: 4.85 M/UL (ref 4.6–6.2)
SODIUM SERPL-SCNC: 137 MMOL/L (ref 136–145)
TRIGL SERPL-MCNC: 608 MG/DL (ref 30–150)
WBC # BLD AUTO: 6.25 K/UL (ref 3.9–12.7)

## 2021-02-15 PROCEDURE — 99396 PR PREVENTIVE VISIT,EST,40-64: ICD-10-PCS | Mod: S$GLB,,, | Performed by: INTERNAL MEDICINE

## 2021-02-15 PROCEDURE — 99396 PREV VISIT EST AGE 40-64: CPT | Mod: S$GLB,,, | Performed by: INTERNAL MEDICINE

## 2021-02-15 PROCEDURE — 36415 COLL VENOUS BLD VENIPUNCTURE: CPT

## 2021-02-15 PROCEDURE — 82043 UR ALBUMIN QUANTITATIVE: CPT

## 2021-02-15 PROCEDURE — 3075F PR MOST RECENT SYSTOLIC BLOOD PRESS GE 130-139MM HG: ICD-10-PCS | Mod: CPTII,S$GLB,, | Performed by: INTERNAL MEDICINE

## 2021-02-15 PROCEDURE — 85025 COMPLETE CBC W/AUTO DIFF WBC: CPT

## 2021-02-15 PROCEDURE — 99999 PR PBB SHADOW E&M-EST. PATIENT-LVL IV: ICD-10-PCS | Mod: PBBFAC,,, | Performed by: INTERNAL MEDICINE

## 2021-02-15 PROCEDURE — 80061 LIPID PANEL: CPT

## 2021-02-15 PROCEDURE — 3008F PR BODY MASS INDEX (BMI) DOCUMENTED: ICD-10-PCS | Mod: CPTII,S$GLB,, | Performed by: INTERNAL MEDICINE

## 2021-02-15 PROCEDURE — 1126F AMNT PAIN NOTED NONE PRSNT: CPT | Mod: S$GLB,,, | Performed by: INTERNAL MEDICINE

## 2021-02-15 PROCEDURE — 3008F BODY MASS INDEX DOCD: CPT | Mod: CPTII,S$GLB,, | Performed by: INTERNAL MEDICINE

## 2021-02-15 PROCEDURE — 1126F PR PAIN SEVERITY QUANTIFIED, NO PAIN PRESENT: ICD-10-PCS | Mod: S$GLB,,, | Performed by: INTERNAL MEDICINE

## 2021-02-15 PROCEDURE — 82570 ASSAY OF URINE CREATININE: CPT

## 2021-02-15 PROCEDURE — 80053 COMPREHEN METABOLIC PANEL: CPT

## 2021-02-15 PROCEDURE — 3079F PR MOST RECENT DIASTOLIC BLOOD PRESSURE 80-89 MM HG: ICD-10-PCS | Mod: CPTII,S$GLB,, | Performed by: INTERNAL MEDICINE

## 2021-02-15 PROCEDURE — 83036 HEMOGLOBIN GLYCOSYLATED A1C: CPT

## 2021-02-15 PROCEDURE — 3079F DIAST BP 80-89 MM HG: CPT | Mod: CPTII,S$GLB,, | Performed by: INTERNAL MEDICINE

## 2021-02-15 PROCEDURE — 86803 HEPATITIS C AB TEST: CPT

## 2021-02-15 PROCEDURE — 86703 HIV-1/HIV-2 1 RESULT ANTBDY: CPT

## 2021-02-15 PROCEDURE — 3075F SYST BP GE 130 - 139MM HG: CPT | Mod: CPTII,S$GLB,, | Performed by: INTERNAL MEDICINE

## 2021-02-15 PROCEDURE — 99999 PR PBB SHADOW E&M-EST. PATIENT-LVL IV: CPT | Mod: PBBFAC,,, | Performed by: INTERNAL MEDICINE

## 2021-02-15 RX ORDER — LISINOPRIL 20 MG/1
TABLET ORAL
Qty: 90 TABLET | Refills: 11 | Status: SHIPPED | OUTPATIENT
Start: 2021-02-15 | End: 2022-05-16

## 2021-02-15 RX ORDER — LANCETS
EACH MISCELLANEOUS
Qty: 100 EACH | Refills: 11 | Status: SHIPPED | OUTPATIENT
Start: 2021-02-15 | End: 2024-02-09

## 2021-02-15 RX ORDER — INSULIN PUMP SYRINGE, 3 ML
EACH MISCELLANEOUS
Qty: 1 EACH | Refills: 0 | Status: SHIPPED | OUTPATIENT
Start: 2021-02-15 | End: 2021-02-19 | Stop reason: SDUPTHER

## 2021-02-17 LAB
HCV AB SERPL QL IA: NEGATIVE
HIV 1+2 AB+HIV1 P24 AG SERPL QL IA: NEGATIVE

## 2021-02-19 ENCOUNTER — TELEPHONE (OUTPATIENT)
Dept: INTERNAL MEDICINE | Facility: CLINIC | Age: 51
End: 2021-02-19

## 2021-02-19 DIAGNOSIS — E78.1 HYPERTRIGLYCERIDEMIA: ICD-10-CM

## 2021-02-19 DIAGNOSIS — E11.42 TYPE 2 DIABETES MELLITUS WITH DIABETIC POLYNEUROPATHY, WITHOUT LONG-TERM CURRENT USE OF INSULIN: ICD-10-CM

## 2021-02-19 RX ORDER — METFORMIN HYDROCHLORIDE 500 MG/1
TABLET ORAL
Qty: 180 TABLET | Refills: 11 | Status: SHIPPED | OUTPATIENT
Start: 2021-02-19 | End: 2021-08-16

## 2021-02-19 RX ORDER — FENOFIBRATE 160 MG/1
160 TABLET ORAL DAILY
Qty: 90 TABLET | Refills: 11 | Status: SHIPPED | OUTPATIENT
Start: 2021-02-19 | End: 2022-05-16

## 2021-02-19 RX ORDER — INSULIN PUMP SYRINGE, 3 ML
EACH MISCELLANEOUS
Qty: 1 EACH | Refills: 0 | Status: SHIPPED | OUTPATIENT
Start: 2021-02-19 | End: 2024-02-09

## 2021-02-25 ENCOUNTER — PATIENT OUTREACH (OUTPATIENT)
Dept: ADMINISTRATIVE | Facility: OTHER | Age: 51
End: 2021-02-25

## 2021-02-25 DIAGNOSIS — E11.9 DIABETES MELLITUS WITHOUT COMPLICATION: Primary | ICD-10-CM

## 2021-04-16 ENCOUNTER — TELEPHONE (OUTPATIENT)
Dept: INTERNAL MEDICINE | Facility: CLINIC | Age: 51
End: 2021-04-16

## 2021-04-19 ENCOUNTER — TELEPHONE (OUTPATIENT)
Dept: INTERNAL MEDICINE | Facility: CLINIC | Age: 51
End: 2021-04-19

## 2021-07-31 ENCOUNTER — CLINICAL SUPPORT (OUTPATIENT)
Dept: URGENT CARE | Facility: CLINIC | Age: 51
End: 2021-07-31
Payer: COMMERCIAL

## 2021-07-31 DIAGNOSIS — Z20.822 EXPOSURE TO COVID-19 VIRUS: Primary | ICD-10-CM

## 2021-07-31 LAB
CTP QC/QA: YES
SARS-COV-2 RDRP RESP QL NAA+PROBE: NEGATIVE

## 2021-07-31 PROCEDURE — U0002 COVID-19 LAB TEST NON-CDC: HCPCS | Mod: QW,S$GLB,, | Performed by: PHYSICIAN ASSISTANT

## 2021-07-31 PROCEDURE — U0002: ICD-10-PCS | Mod: QW,S$GLB,, | Performed by: PHYSICIAN ASSISTANT

## 2021-08-15 DIAGNOSIS — E11.42 TYPE 2 DIABETES MELLITUS WITH DIABETIC POLYNEUROPATHY, WITHOUT LONG-TERM CURRENT USE OF INSULIN: ICD-10-CM

## 2021-08-16 RX ORDER — METFORMIN HYDROCHLORIDE 500 MG/1
TABLET ORAL
Qty: 180 TABLET | Refills: 1 | Status: SHIPPED | OUTPATIENT
Start: 2021-08-16 | End: 2023-07-11 | Stop reason: SDUPTHER

## 2021-08-27 ENCOUNTER — PATIENT MESSAGE (OUTPATIENT)
Dept: ADMINISTRATIVE | Facility: HOSPITAL | Age: 51
End: 2021-08-27

## 2021-08-27 ENCOUNTER — PATIENT OUTREACH (OUTPATIENT)
Dept: ADMINISTRATIVE | Facility: HOSPITAL | Age: 51
End: 2021-08-27

## 2022-03-16 ENCOUNTER — PATIENT MESSAGE (OUTPATIENT)
Dept: ADMINISTRATIVE | Facility: HOSPITAL | Age: 52
End: 2022-03-16
Payer: COMMERCIAL

## 2022-08-09 DIAGNOSIS — Z12.11 COLON CANCER SCREENING: ICD-10-CM

## 2023-05-26 ENCOUNTER — TELEPHONE (OUTPATIENT)
Dept: INTERNAL MEDICINE | Facility: CLINIC | Age: 53
End: 2023-05-26
Payer: COMMERCIAL

## 2023-07-11 ENCOUNTER — OFFICE VISIT (OUTPATIENT)
Dept: INTERNAL MEDICINE | Facility: CLINIC | Age: 53
End: 2023-07-11
Payer: COMMERCIAL

## 2023-07-11 ENCOUNTER — LAB VISIT (OUTPATIENT)
Dept: LAB | Facility: HOSPITAL | Age: 53
End: 2023-07-11
Attending: INTERNAL MEDICINE
Payer: COMMERCIAL

## 2023-07-11 VITALS
SYSTOLIC BLOOD PRESSURE: 150 MMHG | OXYGEN SATURATION: 98 % | HEART RATE: 87 BPM | DIASTOLIC BLOOD PRESSURE: 90 MMHG | BODY MASS INDEX: 34.06 KG/M2 | HEIGHT: 73 IN | WEIGHT: 257 LBS

## 2023-07-11 DIAGNOSIS — E11.42 TYPE 2 DIABETES MELLITUS WITH DIABETIC POLYNEUROPATHY, WITHOUT LONG-TERM CURRENT USE OF INSULIN: ICD-10-CM

## 2023-07-11 DIAGNOSIS — E11.42 TYPE 2 DIABETES MELLITUS WITH DIABETIC POLYNEUROPATHY, WITHOUT LONG-TERM CURRENT USE OF INSULIN: Primary | ICD-10-CM

## 2023-07-11 DIAGNOSIS — I10 ESSENTIAL HYPERTENSION: ICD-10-CM

## 2023-07-11 DIAGNOSIS — Z00.00 ROUTINE GENERAL MEDICAL EXAMINATION AT A HEALTH CARE FACILITY: ICD-10-CM

## 2023-07-11 DIAGNOSIS — N52.9 ERECTILE DYSFUNCTION, UNSPECIFIED ERECTILE DYSFUNCTION TYPE: ICD-10-CM

## 2023-07-11 DIAGNOSIS — E78.1 HYPERTRIGLYCERIDEMIA: ICD-10-CM

## 2023-07-11 LAB
ALBUMIN SERPL BCP-MCNC: 3.8 G/DL (ref 3.5–5.2)
ALP SERPL-CCNC: 60 U/L (ref 55–135)
ALT SERPL W/O P-5'-P-CCNC: 41 U/L (ref 10–44)
ANION GAP SERPL CALC-SCNC: 16 MMOL/L (ref 8–16)
AST SERPL-CCNC: 13 U/L (ref 10–40)
BASOPHILS # BLD AUTO: 0.03 K/UL (ref 0–0.2)
BASOPHILS NFR BLD: 0.7 % (ref 0–1.9)
BILIRUB SERPL-MCNC: 0.8 MG/DL (ref 0.1–1)
BUN SERPL-MCNC: 11 MG/DL (ref 6–20)
CALCIUM SERPL-MCNC: 9.6 MG/DL (ref 8.7–10.5)
CHLORIDE SERPL-SCNC: 102 MMOL/L (ref 95–110)
CHOLEST SERPL-MCNC: 441 MG/DL (ref 120–199)
CHOLEST/HDLC SERPL: 22.1 {RATIO} (ref 2–5)
CO2 SERPL-SCNC: 18 MMOL/L (ref 23–29)
COMPLEXED PSA SERPL-MCNC: 0.39 NG/ML (ref 0–4)
CREAT SERPL-MCNC: 0.8 MG/DL (ref 0.5–1.4)
DIFFERENTIAL METHOD: ABNORMAL
EOSINOPHIL # BLD AUTO: 0.2 K/UL (ref 0–0.5)
EOSINOPHIL NFR BLD: 4.7 % (ref 0–8)
ERYTHROCYTE [DISTWIDTH] IN BLOOD BY AUTOMATED COUNT: 14.1 % (ref 11.5–14.5)
EST. GFR  (NO RACE VARIABLE): >60 ML/MIN/1.73 M^2
ESTIMATED AVG GLUCOSE: 303 MG/DL (ref 68–131)
GLUCOSE SERPL-MCNC: 279 MG/DL (ref 70–110)
HBA1C MFR BLD: 12.2 % (ref 4–5.6)
HCT VFR BLD AUTO: 39.3 % (ref 40–54)
HDLC SERPL-MCNC: 20 MG/DL (ref 40–75)
HDLC SERPL: 4.5 % (ref 20–50)
HGB BLD-MCNC: 13.2 G/DL (ref 14–18)
IMM GRANULOCYTES # BLD AUTO: 0.02 K/UL (ref 0–0.04)
IMM GRANULOCYTES NFR BLD AUTO: 0.5 % (ref 0–0.5)
LDLC SERPL CALC-MCNC: ABNORMAL MG/DL (ref 63–159)
LYMPHOCYTES # BLD AUTO: 1.5 K/UL (ref 1–4.8)
LYMPHOCYTES NFR BLD: 35.7 % (ref 18–48)
MCH RBC QN AUTO: 28.1 PG (ref 27–31)
MCHC RBC AUTO-ENTMCNC: 33.6 G/DL (ref 32–36)
MCV RBC AUTO: 84 FL (ref 82–98)
MONOCYTES # BLD AUTO: 0.4 K/UL (ref 0.3–1)
MONOCYTES NFR BLD: 8.9 % (ref 4–15)
NEUTROPHILS # BLD AUTO: 2.1 K/UL (ref 1.8–7.7)
NEUTROPHILS NFR BLD: 49.5 % (ref 38–73)
NONHDLC SERPL-MCNC: 421 MG/DL
NRBC BLD-RTO: 0 /100 WBC
PLATELET # BLD AUTO: 114 K/UL (ref 150–450)
PMV BLD AUTO: 12.9 FL (ref 9.2–12.9)
POTASSIUM SERPL-SCNC: 4.5 MMOL/L (ref 3.5–5.1)
PROT SERPL-MCNC: 7.6 G/DL (ref 6–8.4)
RBC # BLD AUTO: 4.7 M/UL (ref 4.6–6.2)
SODIUM SERPL-SCNC: 136 MMOL/L (ref 136–145)
TRIGL SERPL-MCNC: 2082 MG/DL (ref 30–150)
WBC # BLD AUTO: 4.29 K/UL (ref 3.9–12.7)

## 2023-07-11 PROCEDURE — 99214 PR OFFICE/OUTPT VISIT, EST, LEVL IV, 30-39 MIN: ICD-10-PCS | Mod: S$GLB,,, | Performed by: INTERNAL MEDICINE

## 2023-07-11 PROCEDURE — 80061 LIPID PANEL: CPT | Performed by: INTERNAL MEDICINE

## 2023-07-11 PROCEDURE — 1160F RVW MEDS BY RX/DR IN RCRD: CPT | Mod: CPTII,S$GLB,, | Performed by: INTERNAL MEDICINE

## 2023-07-11 PROCEDURE — 3080F PR MOST RECENT DIASTOLIC BLOOD PRESSURE >= 90 MM HG: ICD-10-PCS | Mod: CPTII,S$GLB,, | Performed by: INTERNAL MEDICINE

## 2023-07-11 PROCEDURE — 1160F PR REVIEW ALL MEDS BY PRESCRIBER/CLIN PHARMACIST DOCUMENTED: ICD-10-PCS | Mod: CPTII,S$GLB,, | Performed by: INTERNAL MEDICINE

## 2023-07-11 PROCEDURE — 99999 PR PBB SHADOW E&M-EST. PATIENT-LVL IV: ICD-10-PCS | Mod: PBBFAC,,, | Performed by: INTERNAL MEDICINE

## 2023-07-11 PROCEDURE — 36415 COLL VENOUS BLD VENIPUNCTURE: CPT | Performed by: INTERNAL MEDICINE

## 2023-07-11 PROCEDURE — 80053 COMPREHEN METABOLIC PANEL: CPT | Performed by: INTERNAL MEDICINE

## 2023-07-11 PROCEDURE — 3080F DIAST BP >= 90 MM HG: CPT | Mod: CPTII,S$GLB,, | Performed by: INTERNAL MEDICINE

## 2023-07-11 PROCEDURE — 84153 ASSAY OF PSA TOTAL: CPT | Performed by: INTERNAL MEDICINE

## 2023-07-11 PROCEDURE — 1159F MED LIST DOCD IN RCRD: CPT | Mod: CPTII,S$GLB,, | Performed by: INTERNAL MEDICINE

## 2023-07-11 PROCEDURE — 3008F PR BODY MASS INDEX (BMI) DOCUMENTED: ICD-10-PCS | Mod: CPTII,S$GLB,, | Performed by: INTERNAL MEDICINE

## 2023-07-11 PROCEDURE — 4010F ACE/ARB THERAPY RXD/TAKEN: CPT | Mod: CPTII,S$GLB,, | Performed by: INTERNAL MEDICINE

## 2023-07-11 PROCEDURE — 4010F PR ACE/ARB THEARPY RXD/TAKEN: ICD-10-PCS | Mod: CPTII,S$GLB,, | Performed by: INTERNAL MEDICINE

## 2023-07-11 PROCEDURE — 1159F PR MEDICATION LIST DOCUMENTED IN MEDICAL RECORD: ICD-10-PCS | Mod: CPTII,S$GLB,, | Performed by: INTERNAL MEDICINE

## 2023-07-11 PROCEDURE — 99214 OFFICE O/P EST MOD 30 MIN: CPT | Mod: S$GLB,,, | Performed by: INTERNAL MEDICINE

## 2023-07-11 PROCEDURE — 3077F PR MOST RECENT SYSTOLIC BLOOD PRESSURE >= 140 MM HG: ICD-10-PCS | Mod: CPTII,S$GLB,, | Performed by: INTERNAL MEDICINE

## 2023-07-11 PROCEDURE — 83036 HEMOGLOBIN GLYCOSYLATED A1C: CPT | Performed by: INTERNAL MEDICINE

## 2023-07-11 PROCEDURE — 85025 COMPLETE CBC W/AUTO DIFF WBC: CPT | Performed by: INTERNAL MEDICINE

## 2023-07-11 PROCEDURE — 99999 PR PBB SHADOW E&M-EST. PATIENT-LVL IV: CPT | Mod: PBBFAC,,, | Performed by: INTERNAL MEDICINE

## 2023-07-11 PROCEDURE — 3008F BODY MASS INDEX DOCD: CPT | Mod: CPTII,S$GLB,, | Performed by: INTERNAL MEDICINE

## 2023-07-11 PROCEDURE — 3077F SYST BP >= 140 MM HG: CPT | Mod: CPTII,S$GLB,, | Performed by: INTERNAL MEDICINE

## 2023-07-11 RX ORDER — FENOFIBRATE 160 MG/1
160 TABLET ORAL DAILY
Qty: 90 TABLET | Refills: 0 | Status: SHIPPED | OUTPATIENT
Start: 2023-07-11 | End: 2023-11-20 | Stop reason: SDUPTHER

## 2023-07-11 RX ORDER — METFORMIN HYDROCHLORIDE 500 MG/1
500 TABLET ORAL 2 TIMES DAILY WITH MEALS
Qty: 180 TABLET | Refills: 1 | Status: SHIPPED | OUTPATIENT
Start: 2023-07-11 | End: 2023-11-20 | Stop reason: SDUPTHER

## 2023-07-11 RX ORDER — LISINOPRIL 20 MG/1
20 TABLET ORAL DAILY
Qty: 90 TABLET | Refills: 0 | Status: SHIPPED | OUTPATIENT
Start: 2023-07-11 | End: 2023-08-19

## 2023-07-11 RX ORDER — TADALAFIL 20 MG/1
TABLET ORAL
Qty: 10 TABLET | Refills: 11 | Status: SHIPPED | OUTPATIENT
Start: 2023-07-11

## 2023-07-11 NOTE — PROGRESS NOTES
Subjective:       Patient ID: Jesusita De La Cruz is a 53 y.o. male.    Chief Complaint: Annual Exam    Patient is here for followup for chronic conditions.    Some numbness in L hand and L foot.    Also bilat wrists discomfort.    Some ED issues.    DM2:  poor med adherence  no changed Diet  N/a 140 AM sugars  N/a Post-prandial sugars  Only L foot Numbness in feet  no sores in feet  No new Visual changes  no Polyuria/polydipsia.          Review of Systems   Constitutional:  Negative for activity change and unexpected weight change.   HENT:  Negative for hearing loss, rhinorrhea and trouble swallowing.    Eyes:  Negative for discharge and visual disturbance.   Respiratory:  Negative for chest tightness and wheezing.    Cardiovascular:  Negative for chest pain and palpitations.   Gastrointestinal:  Negative for blood in stool, constipation, diarrhea and vomiting.   Endocrine: Negative for polydipsia and polyuria.   Genitourinary:  Negative for difficulty urinating, hematuria and urgency.   Musculoskeletal:  Positive for arthralgias. Negative for joint swelling and neck pain.   Neurological:  Positive for numbness. Negative for weakness and headaches.   Psychiatric/Behavioral:  Positive for dysphoric mood (from stress from work/not being able to take time for self). Negative for confusion.          Objective:      Physical Exam  Constitutional:       General: He is not in acute distress.     Appearance: Normal appearance. He is well-developed. He is obese. He is not ill-appearing, toxic-appearing or diaphoretic.   HENT:      Head: Normocephalic and atraumatic.   Eyes:      General: No scleral icterus.  Neck:      Thyroid: No thyromegaly.   Cardiovascular:      Rate and Rhythm: Normal rate and regular rhythm.      Heart sounds: Normal heart sounds. No murmur heard.    No friction rub. No gallop.   Pulmonary:      Effort: Pulmonary effort is normal. No respiratory distress.      Breath sounds: Normal breath sounds. No  wheezing or rales.   Abdominal:      General: Bowel sounds are normal. There is no distension.      Palpations: Abdomen is soft. There is no mass.      Tenderness: There is no abdominal tenderness. There is no guarding or rebound.   Musculoskeletal:         General: Normal range of motion.      Cervical back: Normal range of motion.      Comments: Protective Sensation (w/ 10 gram monofilament):  Right: Intact  Left: Intact    Visual Inspection:  Skin Breakdown -  Bilateral with tinea and onychomycotic nails    Pedal Pulses:   Right: normal  Left: normal    Posterior tibialis:   Right:normal  Left: normal     Lymphadenopathy:      Cervical: No cervical adenopathy.   Skin:     Findings: No lesion.   Neurological:      Mental Status: He is alert and oriented to person, place, and time.      Comments: Neg Phalen and Tinels bilat   Psychiatric:         Mood and Affect: Mood normal.         Behavior: Behavior normal.         Thought Content: Thought content normal.       Assessment:       1. Type 2 diabetes mellitus with diabetic polyneuropathy, without long-term current use of insulin    2. Routine general medical examination at a health care facility    3. Essential hypertension    4. Hypertriglyceridemia    5. Erectile dysfunction, unspecified erectile dysfunction type        Plan:       Jesusita was seen today for annual exam.    Diagnoses and all orders for this visit:      Lengthy discussion re importance of caring for his health and coming in for appts.  Explained that if he has another no show he would be no longer be able to see me, he VU.        Type 2 diabetes mellitus with diabetic polyneuropathy, without long-term current use of insulin  -     CBC Auto Differential; Future  -     Comprehensive Metabolic Panel; Future  -     Lipid Panel; Future  -     Hemoglobin A1C; Future  -     metFORMIN (GLUCOPHAGE) 500 MG tablet; Take 1 tablet (500 mg total) by mouth 2 (two) times daily with meals.  Educated on  consequences of poor control    Routine general medical examination at a health care facility  -     PSA, Screening; Future    Essential hypertension  -     lisinopriL (PRINIVIL,ZESTRIL) 20 MG tablet; Take 1 tablet (20 mg total) by mouth once daily.  Educated on consequences of poor control    Hypertriglyceridemia  -     fenofibrate 160 MG Tab; Take 1 tablet (160 mg total) by mouth once daily.  Says he stopped eating fried chicken    Erectile dysfunction, unspecified erectile dysfunction type  -     tadalafiL (CIALIS) 20 MG Tab; Use one tablet every 36 hours    I suspect his hand and foot numbness is from dm2.    Wrist discomfort could be from drumming. Call if symptoms worsen    Health Maintenance         Date Due Completion Date    Colorectal Cancer Screening Never done ---    Hemoglobin A1c 05/15/2021 2/15/2021    COVID-19 Vaccine (4 - Pfizer series) 12/17/2021 10/22/2021    LDCT Lung Screen Never done ---    Shingles Vaccine (1 of 2) Never done ---    Influenza Vaccine (1) 09/01/2023 ---    Lipid Panel 02/15/2026 2/15/2021    TETANUS VACCINE 04/16/2029 4/16/2019   Will address above at next appt         No follow-ups on file.    Future Appointments   Date Time Provider Department Center   7/11/2023  9:20 AM LAB, APPOINTMENT [facility]   8/19/2023 10:00 AM [facility]

## 2023-07-12 ENCOUNTER — PATIENT MESSAGE (OUTPATIENT)
Dept: ADMINISTRATIVE | Facility: HOSPITAL | Age: 53
End: 2023-07-12
Payer: COMMERCIAL

## 2023-07-31 ENCOUNTER — TELEPHONE (OUTPATIENT)
Dept: INTERNAL MEDICINE | Facility: CLINIC | Age: 53
End: 2023-07-31
Payer: COMMERCIAL

## 2023-07-31 NOTE — TELEPHONE ENCOUNTER
Hi, please call him -- his cholesterol and triglycerides in his blood work from a few weeks ago was very high and his sugars were very high, his diabetes is not well controlled.    Please ask if he has started the medicines:  Metformin for diabetes, fenofibrate for the triglycerides, and lisinopril for the blood pressure.    I also recommend he see a diabetes nutritionist, let me know if he is willing to see one.    Let me know if patient has any questions.  Thank you, Jonathan Boateng

## 2023-08-01 ENCOUNTER — TELEPHONE (OUTPATIENT)
Dept: INTERNAL MEDICINE | Facility: CLINIC | Age: 53
End: 2023-08-01
Payer: COMMERCIAL

## 2023-08-01 NOTE — TELEPHONE ENCOUNTER
----- Message from Jose Solis MA sent at 7/31/2023  1:21 PM CDT -----  Contact: [phone] Patient  Pt returning phone call.  ----- Message -----  From: Keeley Rubio  Sent: 7/31/2023  12:54 PM CDT  To: [facility] Cast[facility] Staff    Patient is returning a phone call.  Who left a message for the patient: Josh Delgado LPN   Does patient know what this is regarding:  no  Would you like a call back, or a response through your [facility] portal?:   Call Back Please  Comments:

## 2023-08-01 NOTE — TELEPHONE ENCOUNTER
Called and spoke wit pt, informed him of recent blood work results. Pt states he is taking all the medication prescribed to him. Pt admits he does not eat the correct foods, stated today his breakfast was a bag of chips and water. Pt states he will try to start eating better. States he is interested in a nutritionist but can only see some one on Saturdays

## 2023-08-02 NOTE — TELEPHONE ENCOUNTER
Hi, our nutritionist do not work on Saturdays. Please confirm with him that he can see me on this upcoming Saturday --  Future Appointments   Date Time Provider Department Center   8/19/2023 10:00 AM Jonathan Boateng MD [facility]     Thank you, Jonathan Boateng

## 2023-08-19 ENCOUNTER — OFFICE VISIT (OUTPATIENT)
Dept: INTERNAL MEDICINE | Facility: CLINIC | Age: 53
End: 2023-08-19
Payer: COMMERCIAL

## 2023-08-19 VITALS
HEART RATE: 79 BPM | WEIGHT: 260.56 LBS | BODY MASS INDEX: 34.53 KG/M2 | HEIGHT: 73 IN | OXYGEN SATURATION: 100 % | SYSTOLIC BLOOD PRESSURE: 165 MMHG | DIASTOLIC BLOOD PRESSURE: 100 MMHG

## 2023-08-19 DIAGNOSIS — Z12.11 COLON CANCER SCREENING: ICD-10-CM

## 2023-08-19 DIAGNOSIS — I10 ESSENTIAL HYPERTENSION: ICD-10-CM

## 2023-08-19 DIAGNOSIS — E11.42 TYPE 2 DIABETES MELLITUS WITH DIABETIC POLYNEUROPATHY, WITHOUT LONG-TERM CURRENT USE OF INSULIN: Primary | ICD-10-CM

## 2023-08-19 LAB
ALBUMIN/CREAT UR: 1909 UG/MG (ref 0–30)
CREAT UR-MCNC: 155 MG/DL (ref 23–375)
MICROALBUMIN UR DL<=1MG/L-MCNC: 2959 UG/ML

## 2023-08-19 PROCEDURE — 1160F PR REVIEW ALL MEDS BY PRESCRIBER/CLIN PHARMACIST DOCUMENTED: ICD-10-PCS | Mod: CPTII,S$GLB,, | Performed by: INTERNAL MEDICINE

## 2023-08-19 PROCEDURE — 3077F SYST BP >= 140 MM HG: CPT | Mod: CPTII,S$GLB,, | Performed by: INTERNAL MEDICINE

## 2023-08-19 PROCEDURE — 99999 PR PBB SHADOW E&M-EST. PATIENT-LVL V: ICD-10-PCS | Mod: PBBFAC,,, | Performed by: INTERNAL MEDICINE

## 2023-08-19 PROCEDURE — 99215 OFFICE O/P EST HI 40 MIN: CPT | Mod: S$GLB,,, | Performed by: INTERNAL MEDICINE

## 2023-08-19 PROCEDURE — 1159F PR MEDICATION LIST DOCUMENTED IN MEDICAL RECORD: ICD-10-PCS | Mod: CPTII,S$GLB,, | Performed by: INTERNAL MEDICINE

## 2023-08-19 PROCEDURE — 4010F ACE/ARB THERAPY RXD/TAKEN: CPT | Mod: CPTII,S$GLB,, | Performed by: INTERNAL MEDICINE

## 2023-08-19 PROCEDURE — 1160F RVW MEDS BY RX/DR IN RCRD: CPT | Mod: CPTII,S$GLB,, | Performed by: INTERNAL MEDICINE

## 2023-08-19 PROCEDURE — 3046F PR MOST RECENT HEMOGLOBIN A1C LEVEL > 9.0%: ICD-10-PCS | Mod: CPTII,S$GLB,, | Performed by: INTERNAL MEDICINE

## 2023-08-19 PROCEDURE — 3008F BODY MASS INDEX DOCD: CPT | Mod: CPTII,S$GLB,, | Performed by: INTERNAL MEDICINE

## 2023-08-19 PROCEDURE — 1159F MED LIST DOCD IN RCRD: CPT | Mod: CPTII,S$GLB,, | Performed by: INTERNAL MEDICINE

## 2023-08-19 PROCEDURE — 99999 PR PBB SHADOW E&M-EST. PATIENT-LVL V: CPT | Mod: PBBFAC,,, | Performed by: INTERNAL MEDICINE

## 2023-08-19 PROCEDURE — 3080F DIAST BP >= 90 MM HG: CPT | Mod: CPTII,S$GLB,, | Performed by: INTERNAL MEDICINE

## 2023-08-19 PROCEDURE — 3008F PR BODY MASS INDEX (BMI) DOCUMENTED: ICD-10-PCS | Mod: CPTII,S$GLB,, | Performed by: INTERNAL MEDICINE

## 2023-08-19 PROCEDURE — 4010F PR ACE/ARB THEARPY RXD/TAKEN: ICD-10-PCS | Mod: CPTII,S$GLB,, | Performed by: INTERNAL MEDICINE

## 2023-08-19 PROCEDURE — 99215 PR OFFICE/OUTPT VISIT, EST, LEVL V, 40-54 MIN: ICD-10-PCS | Mod: S$GLB,,, | Performed by: INTERNAL MEDICINE

## 2023-08-19 PROCEDURE — 82043 UR ALBUMIN QUANTITATIVE: CPT | Performed by: INTERNAL MEDICINE

## 2023-08-19 PROCEDURE — 3080F PR MOST RECENT DIASTOLIC BLOOD PRESSURE >= 90 MM HG: ICD-10-PCS | Mod: CPTII,S$GLB,, | Performed by: INTERNAL MEDICINE

## 2023-08-19 PROCEDURE — 3046F HEMOGLOBIN A1C LEVEL >9.0%: CPT | Mod: CPTII,S$GLB,, | Performed by: INTERNAL MEDICINE

## 2023-08-19 PROCEDURE — 3077F PR MOST RECENT SYSTOLIC BLOOD PRESSURE >= 140 MM HG: ICD-10-PCS | Mod: CPTII,S$GLB,, | Performed by: INTERNAL MEDICINE

## 2023-08-19 RX ORDER — ATORVASTATIN CALCIUM 40 MG/1
40 TABLET, FILM COATED ORAL DAILY
Qty: 90 TABLET | Refills: 11 | Status: SHIPPED | OUTPATIENT
Start: 2023-08-19

## 2023-08-19 RX ORDER — LISINOPRIL AND HYDROCHLOROTHIAZIDE 20; 25 MG/1; MG/1
1 TABLET ORAL DAILY
Qty: 90 TABLET | Refills: 11 | Status: SHIPPED | OUTPATIENT
Start: 2023-08-19 | End: 2024-02-09 | Stop reason: SDUPTHER

## 2023-08-19 NOTE — PROGRESS NOTES
Subjective:       Patient ID: Jesusita De La Cruz is a 53 y.o. male.    Chief Complaint: Follow-up    Patient is here for followup for chronic conditions.    No complaints.    We discussed his diet -- frequ eats sausages. Eats frieds foods too.    Claims good med adh, may have missed 2-3 days of meds. Diff in morning when he is rushing to get to school and would like to eat with his meds (but runs out of time).      Review of Systems   Constitutional:  Negative for activity change and unexpected weight change.   HENT:  Negative for hearing loss, rhinorrhea and trouble swallowing.    Eyes:  Negative for discharge and visual disturbance.   Respiratory:  Negative for chest tightness and wheezing.    Cardiovascular:  Negative for chest pain and palpitations.   Gastrointestinal:  Negative for blood in stool, constipation, diarrhea and vomiting.   Endocrine: Negative for polydipsia and polyuria.   Genitourinary:  Negative for difficulty urinating, hematuria and urgency.        Bubbles in the urine   Musculoskeletal:  Positive for arthralgias. Negative for joint swelling and neck pain.   Neurological:  Negative for weakness, numbness (denies today) and headaches.   Psychiatric/Behavioral:  Positive for dysphoric mood (from stress from work/not being able to take time for self). Negative for confusion.            Objective:      Physical Exam  Constitutional:       General: He is not in acute distress.     Appearance: Normal appearance. He is well-developed. He is obese. He is not ill-appearing, toxic-appearing or diaphoretic.   HENT:      Head: Normocephalic and atraumatic.   Eyes:      General: No scleral icterus.  Neck:      Thyroid: No thyromegaly.   Cardiovascular:      Rate and Rhythm: Normal rate and regular rhythm.      Heart sounds: Normal heart sounds. No murmur heard.     No friction rub. No gallop.   Pulmonary:      Effort: Pulmonary effort is normal. No respiratory distress.      Breath sounds: Normal breath  sounds. No wheezing or rales.   Abdominal:      General: Bowel sounds are normal. There is no distension.      Palpations: Abdomen is soft. There is no mass.      Tenderness: There is no abdominal tenderness. There is no guarding or rebound.   Musculoskeletal:         General: Normal range of motion.      Cervical back: Normal range of motion.   Lymphadenopathy:      Cervical: No cervical adenopathy.   Skin:     Findings: No lesion.   Neurological:      Mental Status: He is alert and oriented to person, place, and time.      Comments: Neg Phalen and Tinels bilat   Psychiatric:         Mood and Affect: Mood normal.         Behavior: Behavior normal.         Thought Content: Thought content normal.         Assessment:       1. Type 2 diabetes mellitus with diabetic polyneuropathy, without long-term current use of insulin    2. Essential hypertension    3. Colon cancer screening        Plan:       Jesusita was seen today for follow-up.    Diagnoses and all orders for this visit:    Lengthy discussion re importance of taking btr care of dm2, HTN, diet. Waking up with enough time to take meds and eat breakfast.      Type 2 diabetes mellitus with diabetic polyneuropathy, without long-term current use of insulin  -     Microalbumin/Creatinine Ratio, Urine  -     Ambulatory referral/consult to Diabetes Education; Future  -     atorvastatin (LIPITOR) 40 MG tablet; Take 1 tablet (40 mg total) by mouth once daily.    Essential hypertension  -     lisinopriL-hydrochlorothiazide (PRINZIDE,ZESTORETIC) 20-25 mg Tab; Take 1 tablet by mouth once daily.    Colon cancer screening  -     Cologuard Screening (Multitarget Stool DNA); Future  -     Cologuard Screening (Multitarget Stool DNA)    Over 40 minutes spent with patient and majority in counseling and patient education.      Health Maintenance         Date Due Completion Date    Colorectal Cancer Screening Never done ---    Eye Exam 07/14/2018 7/14/2017    Pneumococcal Vaccines  (Age 0-64) (2 - PCV) 04/16/2020 4/16/2019    Foot Exam 04/16/2020 4/16/2019 (Done)    Override on 4/16/2019: Done    COVID-19 Vaccine (4 - Pfizer series) 12/17/2021 10/22/2021    LDCT Lung Screen Never done ---    Shingles Vaccine (1 of 2) Never done ---    Diabetes Urine Screening 02/15/2022 2/15/2021    Influenza Vaccine (1) 09/01/2023 ---    Hemoglobin A1c 10/11/2023 7/11/2023    Lipid Panel 07/11/2024 7/11/2023    Low Dose Statin 08/19/2024 8/19/2023    TETANUS VACCINE 04/16/2029 4/16/2019   Declines eye exam for now         Follow up in about 3 months (around 11/20/2023).    Future Appointments   Date Time Provider Department Center   11/20/2023  8:00 AM Jonathan Boateng MD [facility] [place] [facility]

## 2023-08-21 ENCOUNTER — TELEPHONE (OUTPATIENT)
Dept: DIABETES | Facility: CLINIC | Age: 53
End: 2023-08-21

## 2023-09-15 ENCOUNTER — PATIENT MESSAGE (OUTPATIENT)
Dept: ADMINISTRATIVE | Facility: HOSPITAL | Age: 53
End: 2023-09-15
Payer: COMMERCIAL

## 2023-10-25 DIAGNOSIS — E11.9 TYPE 2 DIABETES MELLITUS WITHOUT COMPLICATION, UNSPECIFIED WHETHER LONG TERM INSULIN USE: ICD-10-CM

## 2023-10-25 DIAGNOSIS — E11.9 TYPE 2 DIABETES MELLITUS WITHOUT COMPLICATION: ICD-10-CM

## 2023-10-30 ENCOUNTER — PATIENT MESSAGE (OUTPATIENT)
Dept: ADMINISTRATIVE | Facility: HOSPITAL | Age: 53
End: 2023-10-30
Payer: COMMERCIAL

## 2023-11-06 ENCOUNTER — PATIENT OUTREACH (OUTPATIENT)
Dept: ADMINISTRATIVE | Facility: HOSPITAL | Age: 53
End: 2023-11-06
Payer: COMMERCIAL

## 2023-11-06 NOTE — PROGRESS NOTES
Health Maintenance Due   Topic Date Due    Colorectal Cancer Screening  Never done    Eye Exam  07/14/2018    Pneumococcal Vaccines (Age 0-64) (2 - PCV) 04/16/2020    Foot Exam  04/16/2020    LDCT Lung Screen  Never done    Shingles Vaccine (1 of 2) Never done    Influenza Vaccine (1) Never done    COVID-19 Vaccine (4 - 2023-24 season) 09/01/2023    Hemoglobin A1c  10/11/2023      Chart reviewed. Triggered LINKS. Updated Care Everywhere. Patient has upcoming pcp appointment.     Myles Riggins CMA  Population Health Care Coordinator  Primary Care Team

## 2023-11-16 ENCOUNTER — TELEPHONE (OUTPATIENT)
Dept: INTERNAL MEDICINE | Facility: CLINIC | Age: 53
End: 2023-11-16
Payer: COMMERCIAL

## 2023-11-16 NOTE — TELEPHONE ENCOUNTER
Called patient checking to see if they had their Diabetic eye exam done this year.   If have not had eye exam done this year, offering the DM eyecam while here to see their pcp.   Left [facility] request call back [phone]   Cristina Baltazar RN HC

## 2023-11-20 ENCOUNTER — LAB VISIT (OUTPATIENT)
Dept: LAB | Facility: HOSPITAL | Age: 53
End: 2023-11-20
Payer: COMMERCIAL

## 2023-11-20 ENCOUNTER — OFFICE VISIT (OUTPATIENT)
Dept: INTERNAL MEDICINE | Facility: CLINIC | Age: 53
End: 2023-11-20
Payer: COMMERCIAL

## 2023-11-20 ENCOUNTER — CLINICAL SUPPORT (OUTPATIENT)
Dept: INTERNAL MEDICINE | Facility: CLINIC | Age: 53
End: 2023-11-20
Attending: INTERNAL MEDICINE
Payer: COMMERCIAL

## 2023-11-20 VITALS — SYSTOLIC BLOOD PRESSURE: 130 MMHG | WEIGHT: 255 LBS | BODY MASS INDEX: 33.64 KG/M2 | DIASTOLIC BLOOD PRESSURE: 80 MMHG

## 2023-11-20 DIAGNOSIS — E11.42 TYPE 2 DIABETES MELLITUS WITH DIABETIC POLYNEUROPATHY, WITHOUT LONG-TERM CURRENT USE OF INSULIN: ICD-10-CM

## 2023-11-20 DIAGNOSIS — E11.9 TYPE 2 DIABETES MELLITUS WITHOUT COMPLICATION: ICD-10-CM

## 2023-11-20 DIAGNOSIS — E11.42 TYPE 2 DIABETES MELLITUS WITH DIABETIC POLYNEUROPATHY, WITHOUT LONG-TERM CURRENT USE OF INSULIN: Primary | ICD-10-CM

## 2023-11-20 DIAGNOSIS — I10 ESSENTIAL HYPERTENSION: ICD-10-CM

## 2023-11-20 DIAGNOSIS — E78.1 HYPERTRIGLYCERIDEMIA: ICD-10-CM

## 2023-11-20 DIAGNOSIS — Z12.11 COLON CANCER SCREENING: ICD-10-CM

## 2023-11-20 LAB
ALBUMIN SERPL BCP-MCNC: 4 G/DL (ref 3.5–5.2)
ALP SERPL-CCNC: 74 U/L (ref 55–135)
ALT SERPL W/O P-5'-P-CCNC: 15 U/L (ref 10–44)
ANION GAP SERPL CALC-SCNC: 15 MMOL/L (ref 8–16)
AST SERPL-CCNC: 26 U/L (ref 10–40)
BILIRUB SERPL-MCNC: 0.4 MG/DL (ref 0.1–1)
BUN SERPL-MCNC: 22 MG/DL (ref 6–20)
CALCIUM SERPL-MCNC: 10.2 MG/DL (ref 8.7–10.5)
CHLORIDE SERPL-SCNC: 100 MMOL/L (ref 95–110)
CHOLEST SERPL-MCNC: 261 MG/DL (ref 120–199)
CHOLEST/HDLC SERPL: 12.4 {RATIO} (ref 2–5)
CO2 SERPL-SCNC: 21 MMOL/L (ref 23–29)
CREAT SERPL-MCNC: 1.5 MG/DL (ref 0.5–1.4)
EST. GFR  (NO RACE VARIABLE): 56 ML/MIN/1.73 M^2
ESTIMATED AVG GLUCOSE: 326 MG/DL (ref 68–131)
ESTIMATED AVG GLUCOSE: 326 MG/DL (ref 68–131)
GLUCOSE SERPL-MCNC: 355 MG/DL (ref 70–110)
HBA1C MFR BLD: 13 % (ref 4–5.6)
HBA1C MFR BLD: 13 % (ref 4–5.6)
HDLC SERPL-MCNC: 21 MG/DL (ref 40–75)
HDLC SERPL: 8 % (ref 20–50)
LDLC SERPL CALC-MCNC: ABNORMAL MG/DL (ref 63–159)
NONHDLC SERPL-MCNC: 240 MG/DL
POTASSIUM SERPL-SCNC: 4.4 MMOL/L (ref 3.5–5.1)
PROT SERPL-MCNC: 8.5 G/DL (ref 6–8.4)
SODIUM SERPL-SCNC: 136 MMOL/L (ref 136–145)
TRIGL SERPL-MCNC: 1523 MG/DL (ref 30–150)

## 2023-11-20 PROCEDURE — 3046F PR MOST RECENT HEMOGLOBIN A1C LEVEL > 9.0%: ICD-10-PCS | Mod: CPTII,S$GLB,, | Performed by: INTERNAL MEDICINE

## 2023-11-20 PROCEDURE — 3066F NEPHROPATHY DOC TX: CPT | Mod: CPTII,S$GLB,, | Performed by: INTERNAL MEDICINE

## 2023-11-20 PROCEDURE — 1160F RVW MEDS BY RX/DR IN RCRD: CPT | Mod: CPTII,S$GLB,, | Performed by: INTERNAL MEDICINE

## 2023-11-20 PROCEDURE — 3079F PR MOST RECENT DIASTOLIC BLOOD PRESSURE 80-89 MM HG: ICD-10-PCS | Mod: CPTII,S$GLB,, | Performed by: INTERNAL MEDICINE

## 2023-11-20 PROCEDURE — 3079F DIAST BP 80-89 MM HG: CPT | Mod: CPTII,S$GLB,, | Performed by: INTERNAL MEDICINE

## 2023-11-20 PROCEDURE — 92228 IMG RTA DETC/MNTR DS PHY/QHP: CPT | Mod: TC,S$GLB,, | Performed by: INTERNAL MEDICINE

## 2023-11-20 PROCEDURE — 1159F PR MEDICATION LIST DOCUMENTED IN MEDICAL RECORD: ICD-10-PCS | Mod: CPTII,S$GLB,, | Performed by: INTERNAL MEDICINE

## 2023-11-20 PROCEDURE — 1159F MED LIST DOCD IN RCRD: CPT | Mod: CPTII,S$GLB,, | Performed by: INTERNAL MEDICINE

## 2023-11-20 PROCEDURE — 92228 DIABETIC EYE SCREENING PHOTO: ICD-10-PCS | Mod: 26,S$GLB,, | Performed by: OPTOMETRIST

## 2023-11-20 PROCEDURE — 4010F PR ACE/ARB THEARPY RXD/TAKEN: ICD-10-PCS | Mod: CPTII,S$GLB,, | Performed by: INTERNAL MEDICINE

## 2023-11-20 PROCEDURE — 80053 COMPREHEN METABOLIC PANEL: CPT | Performed by: INTERNAL MEDICINE

## 2023-11-20 PROCEDURE — 3062F PR POS MACROALBUMINURIA RESULT DOCUMENTED/REVIEW: ICD-10-PCS | Mod: CPTII,S$GLB,, | Performed by: INTERNAL MEDICINE

## 2023-11-20 PROCEDURE — 36415 COLL VENOUS BLD VENIPUNCTURE: CPT | Performed by: INTERNAL MEDICINE

## 2023-11-20 PROCEDURE — 99214 OFFICE O/P EST MOD 30 MIN: CPT | Mod: S$GLB,,, | Performed by: INTERNAL MEDICINE

## 2023-11-20 PROCEDURE — 80061 LIPID PANEL: CPT | Performed by: INTERNAL MEDICINE

## 2023-11-20 PROCEDURE — 3066F PR DOCUMENTATION OF TREATMENT FOR NEPHROPATHY: ICD-10-PCS | Mod: CPTII,S$GLB,, | Performed by: INTERNAL MEDICINE

## 2023-11-20 PROCEDURE — 4010F ACE/ARB THERAPY RXD/TAKEN: CPT | Mod: CPTII,S$GLB,, | Performed by: INTERNAL MEDICINE

## 2023-11-20 PROCEDURE — 83036 HEMOGLOBIN GLYCOSYLATED A1C: CPT | Performed by: INTERNAL MEDICINE

## 2023-11-20 PROCEDURE — 92228 IMG RTA DETC/MNTR DS PHY/QHP: CPT | Mod: 26,S$GLB,, | Performed by: OPTOMETRIST

## 2023-11-20 PROCEDURE — 3008F PR BODY MASS INDEX (BMI) DOCUMENTED: ICD-10-PCS | Mod: CPTII,S$GLB,, | Performed by: INTERNAL MEDICINE

## 2023-11-20 PROCEDURE — 99999 PR PBB SHADOW E&M-EST. PATIENT-LVL III: ICD-10-PCS | Mod: PBBFAC,,, | Performed by: INTERNAL MEDICINE

## 2023-11-20 PROCEDURE — 99214 PR OFFICE/OUTPT VISIT, EST, LEVL IV, 30-39 MIN: ICD-10-PCS | Mod: S$GLB,,, | Performed by: INTERNAL MEDICINE

## 2023-11-20 PROCEDURE — 92228 DIABETIC EYE SCREENING PHOTO: ICD-10-PCS | Mod: TC,S$GLB,, | Performed by: INTERNAL MEDICINE

## 2023-11-20 PROCEDURE — 2025F DIABETIC EYE SCREENING PHOTO: ICD-10-PCS | Mod: CPTII,S$GLB,, | Performed by: OPTOMETRIST

## 2023-11-20 PROCEDURE — 3008F BODY MASS INDEX DOCD: CPT | Mod: CPTII,S$GLB,, | Performed by: INTERNAL MEDICINE

## 2023-11-20 PROCEDURE — 3075F SYST BP GE 130 - 139MM HG: CPT | Mod: CPTII,S$GLB,, | Performed by: INTERNAL MEDICINE

## 2023-11-20 PROCEDURE — 3075F PR MOST RECENT SYSTOLIC BLOOD PRESS GE 130-139MM HG: ICD-10-PCS | Mod: CPTII,S$GLB,, | Performed by: INTERNAL MEDICINE

## 2023-11-20 PROCEDURE — 3062F POS MACROALBUMINURIA REV: CPT | Mod: CPTII,S$GLB,, | Performed by: INTERNAL MEDICINE

## 2023-11-20 PROCEDURE — 3046F HEMOGLOBIN A1C LEVEL >9.0%: CPT | Mod: CPTII,S$GLB,, | Performed by: INTERNAL MEDICINE

## 2023-11-20 PROCEDURE — 1160F PR REVIEW ALL MEDS BY PRESCRIBER/CLIN PHARMACIST DOCUMENTED: ICD-10-PCS | Mod: CPTII,S$GLB,, | Performed by: INTERNAL MEDICINE

## 2023-11-20 PROCEDURE — 2025F 7 FLD RTA PHOTO W/O RTNOPTHY: CPT | Mod: CPTII,S$GLB,, | Performed by: OPTOMETRIST

## 2023-11-20 PROCEDURE — 99999 PR PBB SHADOW E&M-EST. PATIENT-LVL III: CPT | Mod: PBBFAC,,, | Performed by: INTERNAL MEDICINE

## 2023-11-20 RX ORDER — FENOFIBRATE 160 MG/1
160 TABLET ORAL DAILY
Qty: 90 TABLET | Refills: 11 | Status: SHIPPED | OUTPATIENT
Start: 2023-11-20

## 2023-11-20 RX ORDER — METFORMIN HYDROCHLORIDE 500 MG/1
500 TABLET ORAL 2 TIMES DAILY WITH MEALS
Qty: 180 TABLET | Refills: 11 | Status: SHIPPED | OUTPATIENT
Start: 2023-11-20

## 2023-11-20 NOTE — PROGRESS NOTES
Jesusita De La Cruz is a 53 y.o. male here for a diabetic eye screening with non-dilated fundus photos per Dr. Boateng.    Patient cooperative?: Yes  Small pupils?: Yes  Last eye exam: 7/14/2017    For exam results, see Encounter Report.

## 2023-11-20 NOTE — PROGRESS NOTES
Subjective:       Patient ID: Jesusita De La Cruz is a 53 y.o. male.    Chief Complaint: Follow-up    Patient is here for followup for chronic conditions.    No health complaints.    Has been taking pills, claims takes 4 per day, but not sure the names.          Review of Systems   Constitutional:  Negative for activity change and unexpected weight change.   HENT:  Negative for hearing loss, rhinorrhea and trouble swallowing.    Eyes:  Negative for discharge and visual disturbance.   Respiratory:  Negative for chest tightness and wheezing.    Cardiovascular:  Negative for chest pain and palpitations.   Gastrointestinal:  Negative for blood in stool, constipation, diarrhea and vomiting.   Endocrine: Negative for polydipsia and polyuria.   Genitourinary:  Negative for difficulty urinating, hematuria and urgency.        Bubbles in the urine   Musculoskeletal:  Positive for arthralgias. Negative for joint swelling and neck pain.   Neurological:  Negative for weakness, numbness (denies today) and headaches.   Psychiatric/Behavioral:  Negative for confusion and dysphoric mood.            Objective:      Physical Exam  Constitutional:       General: He is not in acute distress.     Appearance: He is well-developed. He is not diaphoretic.      Comments: Well appearing, breathing comfortably, speaking full sentences, no coughing during encounter   HENT:      Head: Normocephalic and atraumatic.   Pulmonary:      Effort: Pulmonary effort is normal. No respiratory distress.   Neurological:      Mental Status: He is alert and oriented to person, place, and time.   Psychiatric:         Behavior: Behavior normal.         Thought Content: Thought content normal.         Judgment: Judgment normal.         Assessment:       1. Type 2 diabetes mellitus with diabetic polyneuropathy, without long-term current use of insulin    2. Essential hypertension    3. Hypertriglyceridemia    4. Colon cancer screening        Plan:       Jesusita was  seen today for follow-up.    Diagnoses and all orders for this visit:    Type 2 diabetes mellitus with diabetic polyneuropathy, without long-term current use of insulin  -     Comprehensive Metabolic Panel; Future  -     Lipid Panel; Future  -     Hemoglobin A1C; Future  -     metFORMIN (GLUCOPHAGE) 500 MG tablet; Take 1 tablet (500 mg total) by mouth 2 (two) times daily with meals.  -     Diabetic Eye Screening Photo; Future    Essential hypertension  -     Comprehensive Metabolic Panel; Future  -     Lipid Panel; Future  -     Hemoglobin A1C; Future    Hypertriglyceridemia  -     Comprehensive Metabolic Panel; Future  -     Lipid Panel; Future  -     Hemoglobin A1C; Future  -     fenofibrate 160 MG Tab; Take 1 tablet (160 mg total) by mouth once daily.    Colon cancer screening  -     Cancel: Cologuard Screening (Multitarget Stool DNA); Future  -     Cancel: Cologuard Screening (Multitarget Stool DNA)      Follow up in about 6 months (around 5/20/2024).    Future Appointments   Date Time Provider Department Center   5/20/2024  8:20 AM Jonathan Boateng MD [facility] [place]

## 2023-11-22 ENCOUNTER — TELEPHONE (OUTPATIENT)
Dept: INTERNAL MEDICINE | Facility: CLINIC | Age: 53
End: 2023-11-22
Payer: COMMERCIAL

## 2023-11-22 NOTE — TELEPHONE ENCOUNTER
Hi, please call him -- his cholesterol levels are better.  His diabetes sugars are still very high. I recommend that the patient see Ms. Filemon Wallace, a diabetes nurse practitioner. Please offer an appointment.    He should continue taking his current medicines  Let me know if patient has any questions.  Thank you, Jonathan Boateng

## 2023-11-22 NOTE — TELEPHONE ENCOUNTER
Called and spoke with pt, informed him of recent blood work results. Pt expressed understanding, we've now scheduled him with Filemon GUTIERREZ

## 2024-01-08 ENCOUNTER — OFFICE VISIT (OUTPATIENT)
Dept: INTERNAL MEDICINE | Facility: CLINIC | Age: 54
End: 2024-01-08
Payer: COMMERCIAL

## 2024-01-08 ENCOUNTER — PATIENT OUTREACH (OUTPATIENT)
Dept: ADMINISTRATIVE | Facility: HOSPITAL | Age: 54
End: 2024-01-08
Payer: COMMERCIAL

## 2024-01-08 VITALS
DIASTOLIC BLOOD PRESSURE: 85 MMHG | WEIGHT: 258.38 LBS | SYSTOLIC BLOOD PRESSURE: 130 MMHG | BODY MASS INDEX: 34.09 KG/M2

## 2024-01-08 DIAGNOSIS — I10 ESSENTIAL HYPERTENSION: Primary | ICD-10-CM

## 2024-01-08 DIAGNOSIS — E11.42 TYPE 2 DIABETES MELLITUS WITH DIABETIC POLYNEUROPATHY, WITHOUT LONG-TERM CURRENT USE OF INSULIN: ICD-10-CM

## 2024-01-08 PROCEDURE — 99214 OFFICE O/P EST MOD 30 MIN: CPT | Mod: S$GLB,,, | Performed by: INTERNAL MEDICINE

## 2024-01-08 PROCEDURE — 1159F MED LIST DOCD IN RCRD: CPT | Mod: CPTII,S$GLB,, | Performed by: INTERNAL MEDICINE

## 2024-01-08 PROCEDURE — 3079F DIAST BP 80-89 MM HG: CPT | Mod: CPTII,S$GLB,, | Performed by: INTERNAL MEDICINE

## 2024-01-08 PROCEDURE — 1160F RVW MEDS BY RX/DR IN RCRD: CPT | Mod: CPTII,S$GLB,, | Performed by: INTERNAL MEDICINE

## 2024-01-08 PROCEDURE — 3008F BODY MASS INDEX DOCD: CPT | Mod: CPTII,S$GLB,, | Performed by: INTERNAL MEDICINE

## 2024-01-08 PROCEDURE — 99999 PR PBB SHADOW E&M-EST. PATIENT-LVL III: CPT | Mod: PBBFAC,,, | Performed by: INTERNAL MEDICINE

## 2024-01-08 PROCEDURE — 3075F SYST BP GE 130 - 139MM HG: CPT | Mod: CPTII,S$GLB,, | Performed by: INTERNAL MEDICINE

## 2024-01-08 RX ORDER — PEN NEEDLE, DIABETIC 30 GX3/16"
1 NEEDLE, DISPOSABLE MISCELLANEOUS DAILY
Qty: 100 EACH | Refills: 11 | Status: SHIPPED | OUTPATIENT
Start: 2024-01-08 | End: 2024-01-16

## 2024-01-08 RX ORDER — INSULIN GLARGINE 100 [IU]/ML
15 INJECTION, SOLUTION SUBCUTANEOUS NIGHTLY
Qty: 15 ML | Refills: 1 | Status: SHIPPED | OUTPATIENT
Start: 2024-01-08 | End: 2024-01-16

## 2024-01-08 NOTE — PROGRESS NOTES
Subjective:       Patient ID: Jesusita De La Cruz is a 53 y.o. male.    Chief Complaint: No chief complaint on file.    Patient is here for followup for chronic conditions.    We reviewed his blood working showing diabetic nephropathy, CKD, very mikhail sugars and improved lipids.    He has not been consistent with metformin -- concerned abt possible side effects.    He has had btr adherence to pressure med and lipid meds.    No other new health issues.          Review of Systems   Constitutional:  Negative for activity change and unexpected weight change.   HENT:  Negative for hearing loss, rhinorrhea and trouble swallowing.    Eyes:  Negative for discharge and visual disturbance.   Respiratory:  Negative for chest tightness and wheezing.    Cardiovascular:  Negative for chest pain and palpitations.   Gastrointestinal:  Negative for blood in stool, constipation, diarrhea and vomiting.   Endocrine: Negative for polydipsia and polyuria.   Genitourinary:  Negative for difficulty urinating, hematuria and urgency.        Bubbles in the urine   Musculoskeletal:  Positive for arthralgias. Negative for joint swelling and neck pain.   Neurological:  Negative for weakness, numbness (denies today) and headaches.   Psychiatric/Behavioral:  Negative for confusion and dysphoric mood.            Objective:      Physical Exam  Constitutional:       General: He is not in acute distress.     Appearance: He is well-developed. He is not diaphoretic.      Comments: Well appearing, breathing comfortably, speaking full sentences, no coughing during encounter   HENT:      Head: Normocephalic and atraumatic.   Pulmonary:      Effort: Pulmonary effort is normal. No respiratory distress.   Neurological:      Mental Status: He is alert and oriented to person, place, and time.   Psychiatric:         Behavior: Behavior normal.         Thought Content: Thought content normal.         Judgment: Judgment normal.         Assessment:       1. Essential  "hypertension    2. Type 2 diabetes mellitus with diabetic polyneuropathy, without long-term current use of insulin        Plan:       Diagnoses and all orders for this visit:    Essential hypertension  Controlled, continue med    Type 2 diabetes mellitus with diabetic polyneuropathy, without long-term current use of insulin  -     Diabetes Digital Medicine (DDMP) Enrollment Order  -     Diabetes Digital Medicine (DDMP): Assign Onboarding Questionnaires  -     insulin (LANTUS SOLOSTAR U-100 INSULIN) glargine 100 units/mL SubQ pen; Inject 15 Units into the skin every evening.  -     pen needle, diabetic 32 gauge x 5/32" Ndle; 1 each by Misc.(Non-Drug; Combo Route) route once daily.  Lengthy discussion re importance of btr dm control and risks involved with poor control, most relevant worsening of CKD.  He agrees to go back on insulin and check sugars daily      Health Maintenance         Date Due Completion Date    Colorectal Cancer Screening Never done ---    Pneumococcal Vaccines (Age 0-64) (2 - PCV) 04/16/2020 4/16/2019    Foot Exam 04/16/2020 4/16/2019 (Done)    Override on 4/16/2019: Done    LDCT Lung Screen Never done ---    Shingles Vaccine (1 of 2) Never done ---    Influenza Vaccine (1) Never done ---    COVID-19 Vaccine (4 - 2023-24 season) 09/01/2023 10/22/2021    Hemoglobin A1c 02/20/2024 11/20/2023    Diabetes Urine Screening 08/19/2024 8/19/2023    Lipid Panel 11/20/2024 11/20/2023    Eye Exam 11/20/2024 11/20/2023    Low Dose Statin 11/22/2024 11/22/2023    TETANUS VACCINE 04/16/2029 4/16/2019   Declines vaccines         Follow up for Schedule with Ms Filemon Wallace for diabetes.    Future Appointments   Date Time Provider Department Center   1/16/2024  8:00 AM Filemon Wallace APRN, PAPITOP [facility] [place]   5/20/2024  8:20 AM Jonathan Boateng MD [facility] [place]              "

## 2024-01-08 NOTE — PROGRESS NOTES
Health Maintenance Due   Topic Date Due    Colorectal Cancer Screening  Never done    Pneumococcal Vaccines (Age 0-64) (2 - PCV) 04/16/2020    Foot Exam  04/16/2020    LDCT Lung Screen  Never done    Shingles Vaccine (1 of 2) Never done    Influenza Vaccine (1) Never done    COVID-19 Vaccine (4 - 2023-24 season) 09/01/2023      Chart reviewed. Triggered LINKS. Updated Care Everywhere.     Myles Riggins CMA  Population Health Care Coordinator  Primary Care Team

## 2024-01-16 DIAGNOSIS — E11.42 TYPE 2 DIABETES MELLITUS WITH DIABETIC POLYNEUROPATHY, WITHOUT LONG-TERM CURRENT USE OF INSULIN: ICD-10-CM

## 2024-01-16 RX ORDER — INSULIN GLARGINE 300 U/ML
INJECTION, SOLUTION SUBCUTANEOUS
Qty: 3 PEN | Refills: 6 | Status: SHIPPED | OUTPATIENT
Start: 2024-01-16 | End: 2024-02-09 | Stop reason: SDUPTHER

## 2024-01-16 RX ORDER — PEN NEEDLE, DIABETIC 30 GX3/16"
NEEDLE, DISPOSABLE MISCELLANEOUS
Qty: 100 EACH | Refills: 3 | Status: SHIPPED | OUTPATIENT
Start: 2024-01-16 | End: 2024-02-09 | Stop reason: SDUPTHER

## 2024-01-22 ENCOUNTER — TELEPHONE (OUTPATIENT)
Dept: INTERNAL MEDICINE | Facility: CLINIC | Age: 54
End: 2024-01-22
Payer: COMMERCIAL

## 2024-02-07 NOTE — PROGRESS NOTES
"CHIEF COMPLAINT: Type 2 Diabetes     HPI: Mr. Jesusita De La Cruz is a 54 y.o. male who was diagnosed with Type 2 DM >3 years ago.  Familial history: father-type 2 dm  Eating on the go, fast foods.  Recently switched to cooked meals.  .   Accompanied by daughter.     CR 1.5-last check    Drinks: water   Sleep - not the best   Walking -steps w/ students, [employer]     Lab Results   Component Value Date    HGBA1C 13.0 (H) 11/20/2023    HGBA1C 13.0 (H) 11/20/2023     Being seen by me for the first time.     PREVIOUS DIABETES MEDICATIONS TRIED  Metformin   Toujeo- not started, no pen needles       CURRENT DIABETIC MEDS: metformin 500 mg twice a day ,     On MDI (injection 1 x a day)  Makes frequent changes to his/her insulin regimen on the basis of blood glucose data  Testing 4 x a day  Patient is willing and able to use the device  Demonstrated an understanding of the technology and is motivated to use CGM  Patient expected to adhere to a comprehensive diabetes treatment plan and patient has adequate medical supervision    Has multiple impaired awareness of hypoglycemia (hypoglycemia unawareness)      Diabetes Management Status    Statin: Taking  ACE/ARB: Taking    Screening or Prevention Patient's value Goal Complete/Controlled?   HgA1C Testing and Control   Lab Results   Component Value Date    HGBA1C 13.0 (H) 11/20/2023    HGBA1C 13.0 (H) 11/20/2023      Annually/Less than 8% No   Lipid profile : 11/20/2023 Annually Yes   LDL control Lab Results   Component Value Date    LDLCALC Invalid, Trig>400.0 11/20/2023    Annually/Less than 100 mg/dl  Yes   Nephropathy screening Lab Results   Component Value Date    LABMICR 2959.0 08/19/2023     No results found for: "PROTEINUA" Annually Yes   Blood pressure BP Readings from Last 1 Encounters:   01/08/24 130/85    Less than 140/90 Yes   Dilated retinal exam : 11/20/2023 Annually Yes   Foot exam   : 01/16/2024 Annually No     REVIEW OF SYSTEMS  General: no weakness, " "fatigue, + weight changes (gain 4#)  Eyes: no double\ +blurred vision, occ, eye pain, or redness  Cardiovascular: no chest pain, palpitations, edema, or murmurs.   Respiratory: no cough or dyspnea.   GI: no heartburn, nausea, or changes in bowel patterns; good appetite.   Skin: no rashes, dryness, itching, or reactions at insulin injection sites.  Neuro: no numbness, tingling, tremors, or vertigo.   Endocrine: + polyuria, polydipsia, polyphagia, heat or cold intolerance.     Vital Signs  BP (!) 154/92 (BP Location: Left arm, Patient Position: Sitting, BP Method: Large (Manual))   Pulse 84   Ht 6' 1" (1.854 m)   Wt 117.5 kg (259 lb 0.7 oz)   SpO2 97%   BMI 34.18 kg/m²     Hemoglobin A1C   Date Value Ref Range Status   11/20/2023 13.0 (H) 4.0 - 5.6 % Final     Comment:     ADA Screening Guidelines:  5.7-6.4%  Consistent with prediabetes  >or=6.5%  Consistent with diabetes    High levels of fetal hemoglobin interfere with the HbA1C  assay. Heterozygous hemoglobin variants (HbS, HgC, etc)do  not significantly interfere with this assay.   However, presence of multiple variants may affect accuracy.     11/20/2023 13.0 (H) 4.0 - 5.6 % Final     Comment:     ADA Screening Guidelines:  5.7-6.4%  Consistent with prediabetes  >or=6.5%  Consistent with diabetes    High levels of fetal hemoglobin interfere with the HbA1C  assay. Heterozygous hemoglobin variants (HbS, HgC, etc)do  not significantly interfere with this assay.   However, presence of multiple variants may affect accuracy.     07/11/2023 12.2 (H) 4.0 - 5.6 % Final     Comment:     ADA Screening Guidelines:  5.7-6.4%  Consistent with prediabetes  >or=6.5%  Consistent with diabetes    High levels of fetal hemoglobin interfere with the HbA1C  assay. Heterozygous hemoglobin variants (HbS, HgC, etc)do  not significantly interfere with this assay.   However, presence of multiple variants may affect accuracy.          Chemistry        Component Value Date/Time    NA " 136 11/20/2023 0849    K 4.4 11/20/2023 0849     11/20/2023 0849    CO2 21 (L) 11/20/2023 0849    BUN 22 (H) 11/20/2023 0849    CREATININE 1.5 (H) 11/20/2023 0849     (H) 11/20/2023 0849        Component Value Date/Time    CALCIUM 10.2 11/20/2023 0849    ALKPHOS 74 11/20/2023 0849    AST 26 11/20/2023 0849    ALT 15 11/20/2023 0849    BILITOT 0.4 11/20/2023 0849    ESTGFRAFRICA >60.0 02/15/2021 0838    EGFRNONAA >60.0 02/15/2021 0838           Lab Results   Component Value Date    TSH 1.741 05/24/2017      Lab Results   Component Value Date    CHOL 261 (H) 11/20/2023    CHOL 441 (H) 07/11/2023    CHOL 699 (H) 02/15/2021     Lab Results   Component Value Date    HDL 21 (L) 11/20/2023    HDL 20 (L) 07/11/2023    HDL 11 (L) 02/15/2021     Lab Results   Component Value Date    LDLCALC Invalid, Trig>400.0 11/20/2023    LDLCALC Invalid, Trig>400.0 07/11/2023    LDLCALC Invalid, Trig>400.0 02/15/2021     Lab Results   Component Value Date    TRIG 1,523 (H) 11/20/2023    TRIG 2,082 (H) 07/11/2023    TRIG 608 (H) 02/15/2021     Lab Results   Component Value Date    CHOLHDL 8.0 (L) 11/20/2023    CHOLHDL 4.5 (L) 07/11/2023    CHOLHDL 1.6 (L) 02/15/2021         PHYSICAL EXAMINATION  Constitutional: Appears well, no distress. Reviewed vitals above.  Eyes: conjunctivae & lids intact; PERRLA, EOMs intact; optic discs   Neck: Supple, trachea midline.   Respiratory: CTA without wheezes, even and unlabored  Cardiovascular: RRR; no edema or varicosities  Lymph: deferred   Skin: warm and dry; no injection site reactions, no acanthosis nigracans observed.  Neuro:patient alert and cooperative, normal affect; steady gait.  Psychiatric: judgement & insight intact, orientation of time, place & person intact, memory; mood & affect wnl     Diabetes Foot Exam: fungal infection -nails  Assessment/Plan  1. Type 2 diabetes mellitus with diabetic polyneuropathy, without long-term current use of insulin  Hemoglobin A1C    pen needle,  "diabetic (BD SARAH 2ND GEN PEN NEEDLE) 32 gauge x 5/32" Ndle    blood-glucose meter kit    lancets Misc    blood sugar diagnostic Strp    Hemoglobin A1C    Lipid Panel    Basic Metabolic Panel    insulin glargine, TOUJEO, (TOUJEO SOLOSTAR U-300 INSULIN) 300 unit/mL (1.5 mL) InPn pen    DISCONTINUED: insulin glargine, TOUJEO, (TOUJEO SOLOSTAR U-300 INSULIN) 300 unit/mL (1.5 mL) InPn pen    DISCONTINUED: tirzepatide (MOUNJARO) 2.5 mg/0.5 mL PnIj      2. Essential hypertension  Basic Metabolic Panel    lisinopriL-hydrochlorothiazide (PRINZIDE,ZESTORETIC) 20-25 mg Tab      3. Hypertriglyceridemia          1-3. Follow up in 3 months w/me  Send rx acei/hctz  No h/o pancreatitis or medullary thyroid ca   High trig > 1000   Correct BG first   Consider mounjaro later   Increase toujeo to 20 units -start  Pa needed for meter/strips/lancets, pen needles  Jardiance 10 mg daily   Continue metformin 500 mg bid  A1c goal less than 7%  Bp elevated  Sglt2i will help  Discussed dietary habits, sleep habit, stressors       OLLOW UP  Follow up in about 3 months (around 5/9/2024).     Time: 30 mins            "

## 2024-02-09 ENCOUNTER — OFFICE VISIT (OUTPATIENT)
Dept: INTERNAL MEDICINE | Facility: CLINIC | Age: 54
End: 2024-02-09
Payer: COMMERCIAL

## 2024-02-09 VITALS
HEART RATE: 84 BPM | BODY MASS INDEX: 34.33 KG/M2 | WEIGHT: 259.06 LBS | DIASTOLIC BLOOD PRESSURE: 90 MMHG | OXYGEN SATURATION: 97 % | HEIGHT: 73 IN | SYSTOLIC BLOOD PRESSURE: 154 MMHG

## 2024-02-09 DIAGNOSIS — I10 ESSENTIAL HYPERTENSION: ICD-10-CM

## 2024-02-09 DIAGNOSIS — E11.42 TYPE 2 DIABETES MELLITUS WITH DIABETIC POLYNEUROPATHY, WITHOUT LONG-TERM CURRENT USE OF INSULIN: Primary | ICD-10-CM

## 2024-02-09 DIAGNOSIS — E78.1 HYPERTRIGLYCERIDEMIA: ICD-10-CM

## 2024-02-09 PROCEDURE — 99214 OFFICE O/P EST MOD 30 MIN: CPT | Mod: S$GLB,,, | Performed by: NURSE PRACTITIONER

## 2024-02-09 PROCEDURE — 3077F SYST BP >= 140 MM HG: CPT | Mod: CPTII,S$GLB,, | Performed by: NURSE PRACTITIONER

## 2024-02-09 PROCEDURE — 1160F RVW MEDS BY RX/DR IN RCRD: CPT | Mod: CPTII,S$GLB,, | Performed by: NURSE PRACTITIONER

## 2024-02-09 PROCEDURE — 3008F BODY MASS INDEX DOCD: CPT | Mod: CPTII,S$GLB,, | Performed by: NURSE PRACTITIONER

## 2024-02-09 PROCEDURE — 3080F DIAST BP >= 90 MM HG: CPT | Mod: CPTII,S$GLB,, | Performed by: NURSE PRACTITIONER

## 2024-02-09 PROCEDURE — 99999 PR PBB SHADOW E&M-EST. PATIENT-LVL IV: CPT | Mod: PBBFAC,,, | Performed by: NURSE PRACTITIONER

## 2024-02-09 PROCEDURE — 1159F MED LIST DOCD IN RCRD: CPT | Mod: CPTII,S$GLB,, | Performed by: NURSE PRACTITIONER

## 2024-02-09 RX ORDER — LANCETS
EACH MISCELLANEOUS
Qty: 100 EACH | Refills: 11 | Status: SHIPPED | OUTPATIENT
Start: 2024-02-09

## 2024-02-09 RX ORDER — INSULIN PUMP SYRINGE, 3 ML
EACH MISCELLANEOUS
Qty: 1 EACH | Refills: 0 | Status: SHIPPED | OUTPATIENT
Start: 2024-02-09 | End: 2025-02-08

## 2024-02-09 RX ORDER — INSULIN GLARGINE 300 U/ML
INJECTION, SOLUTION SUBCUTANEOUS
Qty: 3 PEN | Refills: 6 | Status: SHIPPED | OUTPATIENT
Start: 2024-02-09 | End: 2024-02-09 | Stop reason: SDUPTHER

## 2024-02-09 RX ORDER — PEN NEEDLE, DIABETIC 30 GX3/16"
NEEDLE, DISPOSABLE MISCELLANEOUS
Qty: 100 EACH | Refills: 3 | Status: SHIPPED | OUTPATIENT
Start: 2024-02-09

## 2024-02-09 RX ORDER — LISINOPRIL AND HYDROCHLOROTHIAZIDE 20; 25 MG/1; MG/1
1 TABLET ORAL DAILY
Qty: 90 TABLET | Refills: 3 | Status: SHIPPED | OUTPATIENT
Start: 2024-02-09

## 2024-02-09 RX ORDER — TIRZEPATIDE 2.5 MG/.5ML
INJECTION, SOLUTION SUBCUTANEOUS
Qty: 4 PEN | Refills: 6 | Status: SHIPPED | OUTPATIENT
Start: 2024-02-09 | End: 2024-02-09

## 2024-02-09 RX ORDER — INSULIN GLARGINE 300 U/ML
INJECTION, SOLUTION SUBCUTANEOUS
Qty: 3 PEN | Refills: 6 | Status: SHIPPED | OUTPATIENT
Start: 2024-02-09

## 2024-02-09 NOTE — Clinical Note
Pa needed for toujeo, pen needles, meter/ bundle- check, jardiance  Has not been able to get pen needles, strips, lancets Frustrated Adding jardiance, will watch kidney functions, can't start mounjaro til trig come down-will consider later

## 2024-02-09 NOTE — PATIENT INSTRUCTIONS
Follow up in 3 months w/Irielle  A1c lipid bmp in 3 months     Toujeo 20 units at night  Jardiance 10 mg daily -pill/ take in am  Metformin 500 mg twice a day     Lab Results   Component Value Date    HGBA1C 13.0 (H) 11/20/2023    HGBA1C 13.0 (H) 11/20/2023     Goal less than 7%    Goal  no higher than 180/200     [email or web address]  Eat fit cathie  Online Prasad cathie  [email or web address]    [employer] cathie

## 2024-02-21 ENCOUNTER — TELEPHONE (OUTPATIENT)
Dept: PHARMACY | Facility: CLINIC | Age: 54
End: 2024-02-21
Payer: COMMERCIAL

## 2024-05-06 ENCOUNTER — PATIENT OUTREACH (OUTPATIENT)
Dept: ADMINISTRATIVE | Facility: HOSPITAL | Age: 54
End: 2024-05-06
Payer: COMMERCIAL

## 2024-05-06 DIAGNOSIS — E11.42 TYPE 2 DIABETES MELLITUS WITH DIABETIC POLYNEUROPATHY, WITHOUT LONG-TERM CURRENT USE OF INSULIN: Primary | ICD-10-CM

## 2024-05-06 NOTE — PROGRESS NOTES
Health Maintenance Due   Topic Date Due    Colorectal Cancer Screening  Never done    Pneumococcal Vaccines (Age 0-64) (2 of 2 - PCV) 04/16/2020    Foot Exam  04/16/2020    LDCT Lung Screen  Never done    Shingles Vaccine (1 of 2) Never done    COVID-19 Vaccine (4 - 2023-24 season) 09/01/2023    Hemoglobin A1c  02/20/2024      Chart reviewed. Triggered LINKS. Updated Care Everywhere. Patient has pcp appointment on 05/20/24.     Myles Riggins CMA  Population Health Care Coordinator  Primary Care Team

## 2024-05-06 NOTE — PROGRESS NOTES
Health Maintenance Due   Topic Date Due    Colorectal Cancer Screening  Never done    Pneumococcal Vaccines (Age 0-64) (2 of 2 - PCV) 04/16/2020    Foot Exam  04/16/2020    LDCT Lung Screen  Never done    Shingles Vaccine (1 of 2) Never done    COVID-19 Vaccine (4 - 2023-24 season) 09/01/2023    Hemoglobin A1c  02/20/2024     Triggered LINKS and reconciled immunizations. Updated Care Everywhere. Pt has upcoming lab appt scheduled for 5/21/24- order placed for urine microalbumin and linked. Chart review completed.

## 2024-05-28 ENCOUNTER — TELEPHONE (OUTPATIENT)
Dept: INTERNAL MEDICINE | Facility: CLINIC | Age: 54
End: 2024-05-28
Payer: COMMERCIAL

## 2024-05-28 NOTE — TELEPHONE ENCOUNTER
----- Message from EMIL Pop, LIANNE sent at 5/28/2024  9:09 AM CDT -----  Regarding: ninoska  Reach out for reschedule and lab  Thanks so much  Within 6 weeks  Filemon

## 2024-06-04 ENCOUNTER — OFFICE VISIT (OUTPATIENT)
Dept: INTERNAL MEDICINE | Facility: CLINIC | Age: 54
End: 2024-06-04
Payer: COMMERCIAL

## 2024-06-04 ENCOUNTER — LAB VISIT (OUTPATIENT)
Dept: LAB | Facility: HOSPITAL | Age: 54
End: 2024-06-04
Payer: COMMERCIAL

## 2024-06-04 VITALS
HEIGHT: 73 IN | DIASTOLIC BLOOD PRESSURE: 80 MMHG | SYSTOLIC BLOOD PRESSURE: 138 MMHG | HEART RATE: 87 BPM | OXYGEN SATURATION: 96 % | BODY MASS INDEX: 34.22 KG/M2 | WEIGHT: 258.19 LBS

## 2024-06-04 DIAGNOSIS — E11.42 TYPE 2 DIABETES MELLITUS WITH DIABETIC POLYNEUROPATHY, WITHOUT LONG-TERM CURRENT USE OF INSULIN: Primary | ICD-10-CM

## 2024-06-04 DIAGNOSIS — I10 ESSENTIAL HYPERTENSION: ICD-10-CM

## 2024-06-04 DIAGNOSIS — E78.1 HYPERTRIGLYCERIDEMIA: ICD-10-CM

## 2024-06-04 DIAGNOSIS — E11.42 TYPE 2 DIABETES MELLITUS WITH DIABETIC POLYNEUROPATHY, WITHOUT LONG-TERM CURRENT USE OF INSULIN: ICD-10-CM

## 2024-06-04 LAB
ANION GAP SERPL CALC-SCNC: 13 MMOL/L (ref 8–16)
BUN SERPL-MCNC: 17 MG/DL (ref 6–20)
CALCIUM SERPL-MCNC: 10.4 MG/DL (ref 8.7–10.5)
CHLORIDE SERPL-SCNC: 99 MMOL/L (ref 95–110)
CHOLEST SERPL-MCNC: 445 MG/DL (ref 120–199)
CHOLEST/HDLC SERPL: 23.4 {RATIO} (ref 2–5)
CO2 SERPL-SCNC: 21 MMOL/L (ref 23–29)
CREAT SERPL-MCNC: <0.2 MG/DL (ref 0.5–1.4)
EST. GFR  (NO RACE VARIABLE): >60 ML/MIN/1.73 M^2
ESTIMATED AVG GLUCOSE: 286 MG/DL (ref 68–131)
GLUCOSE SERPL-MCNC: 292 MG/DL (ref 70–110)
HBA1C MFR BLD: 11.6 % (ref 4–5.6)
HDLC SERPL-MCNC: 19 MG/DL (ref 40–75)
HDLC SERPL: 4.3 % (ref 20–50)
LDLC SERPL CALC-MCNC: ABNORMAL MG/DL (ref 63–159)
NONHDLC SERPL-MCNC: 426 MG/DL
POTASSIUM SERPL-SCNC: 4.6 MMOL/L (ref 3.5–5.1)
SODIUM SERPL-SCNC: 133 MMOL/L (ref 136–145)
TRIGL SERPL-MCNC: 3272 MG/DL (ref 30–150)

## 2024-06-04 PROCEDURE — G2211 COMPLEX E/M VISIT ADD ON: HCPCS | Mod: S$GLB,,, | Performed by: INTERNAL MEDICINE

## 2024-06-04 PROCEDURE — 99214 OFFICE O/P EST MOD 30 MIN: CPT | Mod: S$GLB,,, | Performed by: INTERNAL MEDICINE

## 2024-06-04 PROCEDURE — 3008F BODY MASS INDEX DOCD: CPT | Mod: CPTII,S$GLB,, | Performed by: INTERNAL MEDICINE

## 2024-06-04 PROCEDURE — 1159F MED LIST DOCD IN RCRD: CPT | Mod: CPTII,S$GLB,, | Performed by: INTERNAL MEDICINE

## 2024-06-04 PROCEDURE — 80061 LIPID PANEL: CPT | Performed by: NURSE PRACTITIONER

## 2024-06-04 PROCEDURE — 36415 COLL VENOUS BLD VENIPUNCTURE: CPT | Performed by: NURSE PRACTITIONER

## 2024-06-04 PROCEDURE — 3079F DIAST BP 80-89 MM HG: CPT | Mod: CPTII,S$GLB,, | Performed by: INTERNAL MEDICINE

## 2024-06-04 PROCEDURE — 1160F RVW MEDS BY RX/DR IN RCRD: CPT | Mod: CPTII,S$GLB,, | Performed by: INTERNAL MEDICINE

## 2024-06-04 PROCEDURE — 83036 HEMOGLOBIN GLYCOSYLATED A1C: CPT | Performed by: NURSE PRACTITIONER

## 2024-06-04 PROCEDURE — 4010F ACE/ARB THERAPY RXD/TAKEN: CPT | Mod: CPTII,S$GLB,, | Performed by: INTERNAL MEDICINE

## 2024-06-04 PROCEDURE — 99999 PR PBB SHADOW E&M-EST. PATIENT-LVL IV: CPT | Mod: PBBFAC,,, | Performed by: INTERNAL MEDICINE

## 2024-06-04 PROCEDURE — 80048 BASIC METABOLIC PNL TOTAL CA: CPT | Performed by: NURSE PRACTITIONER

## 2024-06-04 PROCEDURE — 3075F SYST BP GE 130 - 139MM HG: CPT | Mod: CPTII,S$GLB,, | Performed by: INTERNAL MEDICINE

## 2024-06-04 NOTE — PROGRESS NOTES
Subjective:       Patient ID: Jesusita De La Cruz is a 54 y.o. male.    Chief Complaint: Follow-up    Patient is here for followup for chronic conditions.    Sine last visit met with Ms Wallace, he feels overwhelmed by too many meds.    He battles with anxiety at times.    Diabetes  He has type 2 diabetes mellitus. No MedicAlert identification noted. Hypoglycemia symptoms include mood changes and nervousness/anxiousness. Pertinent negatives for hypoglycemia include no confusion, dizziness, headaches, hunger, pallor, seizures, sleepiness, speech difficulty, sweats or tremors. Associated symptoms include foot ulcerations. Pertinent negatives for diabetes include no blurred vision, no chest pain, no fatigue, no foot paresthesias, no polydipsia, no polyphagia, no polyuria, no visual change, no weakness and no weight loss. Pertinent negatives for hypoglycemia complications include no blackouts, no hospitalization, no nocturnal hypoglycemia, no required assistance and no required glucagon injection. Symptoms are improving. Diabetic complications include impotence. Pertinent negatives for diabetic complications include no autonomic neuropathy, CVA, heart disease, nephropathy, peripheral neuropathy, PVD or retinopathy. Risk factors for coronary artery disease include dyslipidemia, family history, stress, diabetes mellitus and male sex. Current diabetic treatment includes diet. He is compliant with treatment most of the time. He has not had a previous visit with a dietitian. He does not see a podiatrist.Eye exam is not current.   Follow-up  Pertinent negatives include no chest pain, fatigue, headaches, visual change or weakness.     Review of Systems   Constitutional:  Negative for fatigue and weight loss.   Eyes:  Negative for blurred vision.   Cardiovascular:  Negative for chest pain.   Endocrine: Negative for polydipsia, polyphagia and polyuria.   Genitourinary:  Positive for impotence.   Skin:  Negative for pallor.    Neurological:  Negative for dizziness, tremors, seizures, speech difficulty, weakness and headaches.   Psychiatric/Behavioral:  Negative for confusion. The patient is nervous/anxious.            Objective:      Physical Exam  Constitutional:       General: He is not in acute distress.     Appearance: He is well-developed. He is not diaphoretic.      Comments: Well appearing, breathing comfortably, speaking full sentences, no coughing during encounter   HENT:      Head: Normocephalic and atraumatic.   Pulmonary:      Effort: Pulmonary effort is normal. No respiratory distress.   Neurological:      Mental Status: He is alert and oriented to person, place, and time.   Psychiatric:         Behavior: Behavior normal.         Thought Content: Thought content normal.         Judgment: Judgment normal.         Assessment:       1. Type 2 diabetes mellitus with diabetic polyneuropathy, without long-term current use of insulin    2. Essential hypertension    3. Hypertriglyceridemia        Plan:       Jesusita was seen today for follow-up.    Diagnoses and all orders for this visit:    Type 2 diabetes mellitus with diabetic polyneuropathy, without long-term current use of insulin  Poorly controlled, but he does not want insulin at this time and could not afford one med (likely was invokana).    Essential hypertension  Lengthy discussion re importance of taking med    Hypertriglyceridemia  Discussed meds:  Patient Instructions   Make sure to take these daily:    Current Outpatient Medications   Medication        atorvastatin (LIPITOR) 40 MG tablet    cholesterol        fenofibrate 160 MG Tab    Fat in blood        lisinopriL-hydrochlorothiazide (PRINZIDE,ZESTORETIC) 20-25 mg Tab  Blood pressure    metFORMIN (GLUCOPHAGE) 500 MG tablet    diabetes                 Visit today included increased complexity associated with the care of the episodic problem  addressed and managing the longitudinal care of the patient due to the  serious and/or complex managed problem(s) .      Anxiety -- offered atarax and or counseling -- he declines both    Health Maintenance         Date Due Completion Date    Colorectal Cancer Screening Never done ---    Pneumococcal Vaccines (Age 0-64) (2 of 2 - PCV) 04/16/2020 4/16/2019    Foot Exam 04/16/2020 4/16/2019 (Done)    Override on 4/16/2019: Done    LDCT Lung Screen Never done ---    Shingles Vaccine (1 of 2) Never done ---    COVID-19 Vaccine (4 - 2023-24 season) 09/01/2023 10/22/2021    Hemoglobin A1c 02/20/2024 11/20/2023    Diabetes Urine Screening 08/19/2024 8/19/2023    Influenza Vaccine (Season Ended) 09/01/2024 ---    Lipid Panel 11/20/2024 11/20/2023    Eye Exam 11/20/2024 11/20/2023    Low Dose Statin 02/09/2025 2/9/2024    TETANUS VACCINE 04/16/2029 4/16/2019   See back and discuss HCM         No follow-ups on file.    Future Appointments   Date Time Provider Department Center   6/4/2024  9:10 AM LAB, APPOINTMENT [facility] DONAVAN MENON LAB IM [place] [place] [facility]   6/4/2024  9:20 AM LAB, SPECIMEN [facility] DONAVAN STEINBERG [facility] [place]   8/10/2024 10:40 AM Jonathan Boateng MD [facility] IM [place] [place]

## 2024-06-04 NOTE — PATIENT INSTRUCTIONS
Make sure to take these daily:    Current Outpatient Medications   Medication        atorvastatin (LIPITOR) 40 MG tablet    cholesterol        fenofibrate 160 MG Tab    Fat in blood        lisinopriL-hydrochlorothiazide (PRINZIDE,ZESTORETIC) 20-25 mg Tab  Blood pressure    metFORMIN (GLUCOPHAGE) 500 MG tablet    diabetes

## 2024-06-10 ENCOUNTER — PATIENT MESSAGE (OUTPATIENT)
Dept: INTERNAL MEDICINE | Facility: CLINIC | Age: 54
End: 2024-06-10
Payer: COMMERCIAL

## 2024-06-11 ENCOUNTER — TELEPHONE (OUTPATIENT)
Dept: INTERNAL MEDICINE | Facility: CLINIC | Age: 54
End: 2024-06-11
Payer: COMMERCIAL

## 2024-06-11 NOTE — TELEPHONE ENCOUNTER
Called and spoke to patient. Discussed going back on meds.patient stated that he couldn't afford them before. Encouraged him to watch diet with processed foods and consistency with medications. Patient in agreement with plan of care.

## 2024-06-11 NOTE — TELEPHONE ENCOUNTER
----- Message from EMIL Pop, LIANNE sent at 6/8/2024  4:37 PM CDT -----  Regarding: labs: very high triglycerdes  Concerns with high triglycerides and labs.  Ask if pt has cardiologist?  Will need to be seen by one.  Keep me posted,  Filemon

## 2024-08-09 ENCOUNTER — PATIENT OUTREACH (OUTPATIENT)
Dept: ADMINISTRATIVE | Facility: HOSPITAL | Age: 54
End: 2024-08-09
Payer: COMMERCIAL

## 2024-08-24 ENCOUNTER — TELEPHONE (OUTPATIENT)
Dept: PHARMACY | Facility: CLINIC | Age: 54
End: 2024-08-24
Payer: COMMERCIAL

## 2024-08-24 NOTE — TELEPHONE ENCOUNTER
[facility]/Population Health Chart Review & Patient Outreach Details For Medication Adherence Project    Reason for Outreach Encounter: 3rd Party payor non-compliance report ([employer], [employer], [facility], etc)  2.  Patient Outreach Method: Reviewed Patient Chart  3.   Medication in question:   Diabetes Medications               insulin glargine, TOUJEO, (TOUJEO SOLOSTAR U-300 INSULIN) 300 unit/mL (1.5 mL) InPn pen Inject 20 units at night    metFORMIN (GLUCOPHAGE) 500 MG tablet Take 1 tablet (500 mg total) by mouth 2 (two) times daily with meals.              Hypertension Medications               lisinopriL-hydrochlorothiazide (PRINZIDE,ZESTORETIC) 20-25 mg Tab Take 1 tablet by mouth once daily.              Hyperlipidemia Medications               atorvastatin (LIPITOR) 40 MG tablet Take 1 tablet (40 mg total) by mouth once daily.    fenofibrate 160 MG Tab Take 1 tablet (160 mg total) by mouth once daily.               4.  Reviewed and or Updates Made To: Patient Chart  5. Outreach Outcomes and/or actions taken: Patient filled medication and is on track to be adherent

## 2024-08-30 ENCOUNTER — OFFICE VISIT (OUTPATIENT)
Dept: INTERNAL MEDICINE | Facility: CLINIC | Age: 54
End: 2024-08-30
Payer: COMMERCIAL

## 2024-08-30 ENCOUNTER — LAB VISIT (OUTPATIENT)
Dept: LAB | Facility: HOSPITAL | Age: 54
End: 2024-08-30
Attending: INTERNAL MEDICINE
Payer: COMMERCIAL

## 2024-08-30 VITALS
WEIGHT: 250.88 LBS | HEART RATE: 85 BPM | BODY MASS INDEX: 33.25 KG/M2 | SYSTOLIC BLOOD PRESSURE: 145 MMHG | DIASTOLIC BLOOD PRESSURE: 95 MMHG | HEIGHT: 73 IN | OXYGEN SATURATION: 98 %

## 2024-08-30 DIAGNOSIS — I10 ESSENTIAL HYPERTENSION: ICD-10-CM

## 2024-08-30 DIAGNOSIS — E11.42 TYPE 2 DIABETES MELLITUS WITH DIABETIC POLYNEUROPATHY, WITHOUT LONG-TERM CURRENT USE OF INSULIN: ICD-10-CM

## 2024-08-30 DIAGNOSIS — F41.9 ANXIETY: Primary | ICD-10-CM

## 2024-08-30 DIAGNOSIS — E78.1 HYPERTRIGLYCERIDEMIA: ICD-10-CM

## 2024-08-30 LAB
ALBUMIN SERPL BCP-MCNC: 4.1 G/DL (ref 3.5–5.2)
ALP SERPL-CCNC: 88 U/L (ref 55–135)
ALT SERPL W/O P-5'-P-CCNC: 13 U/L (ref 10–44)
ANION GAP SERPL CALC-SCNC: 17 MMOL/L (ref 8–16)
AST SERPL-CCNC: 24 U/L (ref 10–40)
BILIRUB SERPL-MCNC: 0.5 MG/DL (ref 0.1–1)
BUN SERPL-MCNC: 19 MG/DL (ref 6–20)
CALCIUM SERPL-MCNC: 10.4 MG/DL (ref 8.7–10.5)
CHLORIDE SERPL-SCNC: 101 MMOL/L (ref 95–110)
CHOLEST SERPL-MCNC: 277 MG/DL (ref 120–199)
CHOLEST/HDLC SERPL: 16.3 {RATIO} (ref 2–5)
CO2 SERPL-SCNC: 18 MMOL/L (ref 23–29)
CREAT SERPL-MCNC: 1.5 MG/DL (ref 0.5–1.4)
EST. GFR  (NO RACE VARIABLE): 55.7 ML/MIN/1.73 M^2
ESTIMATED AVG GLUCOSE: 335 MG/DL (ref 68–131)
GLUCOSE SERPL-MCNC: 380 MG/DL (ref 70–110)
HBA1C MFR BLD: 13.3 % (ref 4–5.6)
HDLC SERPL-MCNC: 17 MG/DL (ref 40–75)
HDLC SERPL: 6.1 % (ref 20–50)
LDLC SERPL CALC-MCNC: ABNORMAL MG/DL (ref 63–159)
NONHDLC SERPL-MCNC: 260 MG/DL
POTASSIUM SERPL-SCNC: 3.9 MMOL/L (ref 3.5–5.1)
PROT SERPL-MCNC: 9.2 G/DL (ref 6–8.4)
SODIUM SERPL-SCNC: 136 MMOL/L (ref 136–145)
TRIGL SERPL-MCNC: 1789 MG/DL (ref 30–150)

## 2024-08-30 PROCEDURE — 83036 HEMOGLOBIN GLYCOSYLATED A1C: CPT | Performed by: INTERNAL MEDICINE

## 2024-08-30 PROCEDURE — 80053 COMPREHEN METABOLIC PANEL: CPT | Performed by: INTERNAL MEDICINE

## 2024-08-30 PROCEDURE — 99999 PR PBB SHADOW E&M-EST. PATIENT-LVL IV: CPT | Mod: PBBFAC,,, | Performed by: INTERNAL MEDICINE

## 2024-08-30 PROCEDURE — 36415 COLL VENOUS BLD VENIPUNCTURE: CPT | Performed by: INTERNAL MEDICINE

## 2024-08-30 PROCEDURE — 80061 LIPID PANEL: CPT | Performed by: INTERNAL MEDICINE

## 2024-08-30 RX ORDER — FENOFIBRATE 160 MG/1
160 TABLET ORAL DAILY
Qty: 90 TABLET | Refills: 11 | Status: SHIPPED | OUTPATIENT
Start: 2024-08-30

## 2024-08-30 RX ORDER — ATORVASTATIN CALCIUM 40 MG/1
40 TABLET, FILM COATED ORAL DAILY
Qty: 90 TABLET | Refills: 11 | Status: SHIPPED | OUTPATIENT
Start: 2024-08-30

## 2024-08-30 RX ORDER — CITALOPRAM 10 MG/1
10 TABLET ORAL DAILY
Qty: 30 TABLET | Refills: 5 | Status: SHIPPED | OUTPATIENT
Start: 2024-08-30

## 2024-08-30 RX ORDER — METFORMIN HYDROCHLORIDE 500 MG/1
500 TABLET ORAL 2 TIMES DAILY WITH MEALS
Qty: 180 TABLET | Refills: 11 | Status: SHIPPED | OUTPATIENT
Start: 2024-08-30

## 2024-08-30 NOTE — PROGRESS NOTES
Subjective:       Patient ID: Jesusita De La Cruz is a 54 y.o. male.    Chief Complaint: Follow-up (3 months)    Patient is here for followup for chronic conditions.    Much recent stress related to neighbor situation and stress from work.  He does claim to be adherent to his medicines daily.  Does forget sometimes to take metformin later in the day.    Says that he has started to eat better although last night he drank alcohol and did not eat well.    Diabetes  He has type 2 diabetes mellitus. No MedicAlert identification noted. The initial diagnosis of diabetes was made 7 years ago. Hypoglycemia symptoms include nervousness/anxiousness. Pertinent negatives for hypoglycemia include no confusion, dizziness, headaches, hunger, mood changes, pallor, seizures, sleepiness, speech difficulty, sweats or tremors. Pertinent negatives for diabetes include no blurred vision, no chest pain, no fatigue, no foot paresthesias, no foot ulcerations, no polydipsia, no polyphagia, no polyuria, no visual change, no weakness and no weight loss. Pertinent negatives for hypoglycemia complications include no blackouts, no hospitalization, no nocturnal hypoglycemia, no required assistance and no required glucagon injection. Symptoms are improving. Diabetic complications include impotence. Pertinent negatives for diabetic complications include no autonomic neuropathy, CVA, heart disease, nephropathy, peripheral neuropathy, PVD or retinopathy. Risk factors for coronary artery disease include dyslipidemia, family history, hypertension and diabetes mellitus. Current diabetic treatment includes oral agent (monotherapy). He is compliant with treatment all of the time.   Follow-up  Pertinent negatives include no chest pain, fatigue, headaches, visual change or weakness.     Review of Systems   Constitutional:  Negative for fatigue and weight loss.   Eyes:  Negative for blurred vision.   Respiratory:  Negative for shortness of breath.     Cardiovascular:  Negative for chest pain.   Endocrine: Negative for polydipsia, polyphagia and polyuria.   Genitourinary:  Positive for impotence.   Skin:  Negative for pallor.   Neurological:  Negative for dizziness, tremors, seizures, speech difficulty, weakness and headaches.   Psychiatric/Behavioral:  Negative for confusion. The patient is nervous/anxious.            Objective:      Physical Exam  Vitals reviewed.   Constitutional:       General: He is not in acute distress.     Appearance: Normal appearance. He is well-developed. He is not ill-appearing, toxic-appearing or diaphoretic.   HENT:      Head: Normocephalic and atraumatic.   Eyes:      General: No scleral icterus.  Neck:      Thyroid: No thyromegaly.   Cardiovascular:      Rate and Rhythm: Normal rate and regular rhythm.      Heart sounds: Normal heart sounds. No murmur heard.     No friction rub. No gallop.   Pulmonary:      Effort: Pulmonary effort is normal. No respiratory distress.      Breath sounds: Normal breath sounds. No wheezing or rales.   Abdominal:      General: Bowel sounds are normal. There is no distension.      Palpations: Abdomen is soft. There is no mass.      Tenderness: There is no abdominal tenderness. There is no guarding or rebound.   Musculoskeletal:         General: Normal range of motion.      Cervical back: Normal range of motion.   Lymphadenopathy:      Cervical: No cervical adenopathy.   Skin:     Findings: No lesion.   Neurological:      Mental Status: He is alert and oriented to person, place, and time.   Psychiatric:         Mood and Affect: Mood normal.         Thought Content: Thought content normal.      Comments: Anxious appearing at times         Assessment:       1. Anxiety    2. Type 2 diabetes mellitus with diabetic polyneuropathy, without long-term current use of insulin    3. Essential hypertension    4. Hypertriglyceridemia        Plan:       Jesusita was seen today for follow-up.    Diagnoses and all  orders for this visit:    Anxiety  -     citalopram (CELEXA) 10 MG tablet; Take 1 tablet (10 mg total) by mouth once daily.  He declines social work referral.    Type 2 diabetes mellitus with diabetic polyneuropathy, without long-term current use of insulin  -     Comprehensive Metabolic Panel; Future  -     Lipid Panel; Future  -     Hemoglobin A1C; Future  -     metFORMIN (GLUCOPHAGE) 500 MG tablet; Take 1 tablet (500 mg total) by mouth 2 (two) times daily with meals.  -     atorvastatin (LIPITOR) 40 MG tablet; Take 1 tablet (40 mg total) by mouth once daily.    Essential hypertension  I walked him to the digital hypertension bar today to discuss how to activate his account.  He has not been able to check because he did not have the pressure cuff.  He prefers not to add a pressure pill at this time but instead wants to check his pressures at home.    Hypertriglyceridemia  -     fenofibrate 160 MG Tab; Take 1 tablet (160 mg total) by mouth once daily.    Visit today included increased complexity associated with the care of the episodic problem  addressed and managing the longitudinal care of the patient due to the serious and/or complex managed problem(s) .    Follow up in about 12 weeks (around 11/22/2024).    Future Appointments   Date Time Provider Department Center   11/12/2024  9:20 AM Jonathan Boateng MD [facility] [place]

## 2024-09-20 ENCOUNTER — TELEPHONE (OUTPATIENT)
Dept: INTERNAL MEDICINE | Facility: CLINIC | Age: 54
End: 2024-09-20
Payer: COMMERCIAL

## 2024-09-20 DIAGNOSIS — E11.42 TYPE 2 DIABETES MELLITUS WITH DIABETIC POLYNEUROPATHY, WITHOUT LONG-TERM CURRENT USE OF INSULIN: Primary | ICD-10-CM

## 2024-09-20 NOTE — TELEPHONE ENCOUNTER
Hi, please call the patient his diabetes blood test from few weeks ago shows that his sugars are very high.  Please ask if he is taking the insulin at this time.  If he is taking insulin please ask how many units he is taking a day.  I recommend he see someone in endocrinology are diabetes department.  Please ask if he is willing to see a specialist.  Please assist in scheduling    Orders Placed This Encounter    Ambulatory referral/consult to Endocrinology     His cholesterol level is much better he should continue the medicines atorvastatin and fenofibrate.    Let me know if patient has any questions.  Thank you, Jonathan Boateng

## 2024-09-25 ENCOUNTER — TELEPHONE (OUTPATIENT)
Dept: PHARMACY | Facility: CLINIC | Age: 54
End: 2024-09-25
Payer: COMMERCIAL

## 2024-09-25 NOTE — TELEPHONE ENCOUNTER
[facility]/Population Health Chart Review & Patient Outreach Details For Medication Adherence Project    Reason for Outreach Encounter: 3rd Party payor non-compliance report ([employer], [employer], C, etc)  2.  Patient Outreach Method: Reviewed patient chart   3.   Medication in question:   Hyperlipidemia Medications               atorvastatin (LIPITOR) 40 MG tablet Take 1 tablet (40 mg total) by mouth once daily.    fenofibrate 160 MG Tab Take 1 tablet (160 mg total) by mouth once daily.                LAST FILLED: 8/30/24 for 90 day supply  4.  Reviewed and or Updates Made To: Patient Chart  5. Outreach Outcomes and/or actions taken: Patient filled medication and is on track to be adherent  Additional Notes:

## 2024-10-11 ENCOUNTER — PATIENT OUTREACH (OUTPATIENT)
Dept: ADMINISTRATIVE | Facility: HOSPITAL | Age: 54
End: 2024-10-11
Payer: COMMERCIAL

## 2024-10-11 NOTE — PROGRESS NOTES
Health Maintenance Due   Topic Date Due    Colorectal Cancer Screening  Never done    Pneumococcal Vaccines (Age 0-64) (2 of 2 - PCV) 04/16/2020    Foot Exam  04/16/2020    LDCT Lung Screen  Never done    Shingles Vaccine (1 of 2) Never done    Diabetes Urine Screening  08/19/2024    Influenza Vaccine (1) Never done    COVID-19 Vaccine (4 - 2024-25 season) 09/01/2024    Eye Exam  11/20/2024     Triggered LINKS and reconciled immunizations. Updated Care Everywhere. Checked pt's Hypertension Digital Medicine flow sheet for an updated BP reading; no recent BP readings recorded. Pt outreached to check home BP via Hypertension Digital Medicine. Chart review completed.

## 2024-10-20 DIAGNOSIS — F41.9 ANXIETY: ICD-10-CM

## 2024-10-20 NOTE — TELEPHONE ENCOUNTER
Care Due:                  Date            Visit Type   Department     Provider  --------------------------------------------------------------------------------                                MYCHART                              FOLLOWUP/OF  [facility] INTERNAL  Last Visit: 08-      FICE VISIT   MEDICINE       Jonathan Boateng                              EP -                              PRIMARY      [facility] INTERNAL  Next Visit: 11-      CARE ([facility])   MEDICINE       Jonathan Boateng                                                            Last  Test          Frequency    Reason                     Performed    Due Date  --------------------------------------------------------------------------------    CBC.........  12 months..  fenofibrate..............  07- 07-    [facility] Embedded Care Due Messages. Reference number: [id number].   10/20/2024 1:31:58 PM CDT

## 2024-10-21 RX ORDER — CITALOPRAM 10 MG/1
10 TABLET ORAL DAILY
Qty: 30 TABLET | Refills: 5 | Status: SHIPPED | OUTPATIENT
Start: 2024-10-21

## 2024-10-21 NOTE — TELEPHONE ENCOUNTER
Refill Routing Note   Medication(s) are not appropriate for processing by [facility] for the following reason(s):        New or recently adjusted medication    [facility] action(s):  Defer     Requires labs : Yes      Medication Therapy Plan: FOVS; START DATE:  08/30/24      Appointments  past 12m or future 3m with PCP    Date Provider   Last Visit   8/30/2024 Jonathan Boateng MD   Next Visit   11/12/2024 Jonathan Boateng MD   ED visits in past 90 days: 0        Note composed:11:17 AM 10/21/2024

## 2024-12-02 ENCOUNTER — OFFICE VISIT (OUTPATIENT)
Dept: INTERNAL MEDICINE | Facility: CLINIC | Age: 54
End: 2024-12-02
Payer: COMMERCIAL

## 2024-12-02 ENCOUNTER — OFFICE VISIT (OUTPATIENT)
Dept: ENDOCRINOLOGY | Facility: CLINIC | Age: 54
End: 2024-12-02
Payer: COMMERCIAL

## 2024-12-02 ENCOUNTER — PATIENT MESSAGE (OUTPATIENT)
Dept: INTERNAL MEDICINE | Facility: CLINIC | Age: 54
End: 2024-12-02

## 2024-12-02 VITALS
OXYGEN SATURATION: 96 % | BODY MASS INDEX: 34.17 KG/M2 | DIASTOLIC BLOOD PRESSURE: 90 MMHG | HEIGHT: 73 IN | SYSTOLIC BLOOD PRESSURE: 164 MMHG | HEART RATE: 89 BPM | WEIGHT: 257.81 LBS

## 2024-12-02 VITALS
BODY MASS INDEX: 34.22 KG/M2 | OXYGEN SATURATION: 97 % | SYSTOLIC BLOOD PRESSURE: 150 MMHG | HEIGHT: 73 IN | HEART RATE: 92 BPM | DIASTOLIC BLOOD PRESSURE: 90 MMHG | WEIGHT: 258.19 LBS

## 2024-12-02 DIAGNOSIS — E11.42 TYPE 2 DIABETES MELLITUS WITH DIABETIC POLYNEUROPATHY, WITHOUT LONG-TERM CURRENT USE OF INSULIN: ICD-10-CM

## 2024-12-02 DIAGNOSIS — E78.1 HYPERTRIGLYCERIDEMIA: ICD-10-CM

## 2024-12-02 DIAGNOSIS — Z12.11 COLON CANCER SCREENING: ICD-10-CM

## 2024-12-02 DIAGNOSIS — I10 ESSENTIAL HYPERTENSION: ICD-10-CM

## 2024-12-02 DIAGNOSIS — I10 ESSENTIAL HYPERTENSION: Primary | ICD-10-CM

## 2024-12-02 DIAGNOSIS — E11.42 TYPE 2 DIABETES MELLITUS WITH DIABETIC POLYNEUROPATHY, WITHOUT LONG-TERM CURRENT USE OF INSULIN: Primary | ICD-10-CM

## 2024-12-02 PROCEDURE — G2211 COMPLEX E/M VISIT ADD ON: HCPCS | Mod: S$GLB,,, | Performed by: INTERNAL MEDICINE

## 2024-12-02 PROCEDURE — 3046F HEMOGLOBIN A1C LEVEL >9.0%: CPT | Mod: CPTII,S$GLB,, | Performed by: STUDENT IN AN ORGANIZED HEALTH CARE EDUCATION/TRAINING PROGRAM

## 2024-12-02 PROCEDURE — 3080F DIAST BP >= 90 MM HG: CPT | Mod: CPTII,S$GLB,, | Performed by: INTERNAL MEDICINE

## 2024-12-02 PROCEDURE — 3008F BODY MASS INDEX DOCD: CPT | Mod: CPTII,S$GLB,, | Performed by: STUDENT IN AN ORGANIZED HEALTH CARE EDUCATION/TRAINING PROGRAM

## 2024-12-02 PROCEDURE — 99214 OFFICE O/P EST MOD 30 MIN: CPT | Mod: S$GLB,,, | Performed by: INTERNAL MEDICINE

## 2024-12-02 PROCEDURE — 3077F SYST BP >= 140 MM HG: CPT | Mod: CPTII,S$GLB,, | Performed by: STUDENT IN AN ORGANIZED HEALTH CARE EDUCATION/TRAINING PROGRAM

## 2024-12-02 PROCEDURE — 99999 PR PBB SHADOW E&M-EST. PATIENT-LVL V: CPT | Mod: PBBFAC,,, | Performed by: STUDENT IN AN ORGANIZED HEALTH CARE EDUCATION/TRAINING PROGRAM

## 2024-12-02 PROCEDURE — 1159F MED LIST DOCD IN RCRD: CPT | Mod: CPTII,S$GLB,, | Performed by: INTERNAL MEDICINE

## 2024-12-02 PROCEDURE — 3080F DIAST BP >= 90 MM HG: CPT | Mod: CPTII,S$GLB,, | Performed by: STUDENT IN AN ORGANIZED HEALTH CARE EDUCATION/TRAINING PROGRAM

## 2024-12-02 PROCEDURE — 99999 PR PBB SHADOW E&M-EST. PATIENT-LVL V: CPT | Mod: PBBFAC,,, | Performed by: INTERNAL MEDICINE

## 2024-12-02 PROCEDURE — G2211 COMPLEX E/M VISIT ADD ON: HCPCS | Mod: S$GLB,,, | Performed by: STUDENT IN AN ORGANIZED HEALTH CARE EDUCATION/TRAINING PROGRAM

## 2024-12-02 PROCEDURE — 1160F RVW MEDS BY RX/DR IN RCRD: CPT | Mod: CPTII,S$GLB,, | Performed by: STUDENT IN AN ORGANIZED HEALTH CARE EDUCATION/TRAINING PROGRAM

## 2024-12-02 PROCEDURE — 3008F BODY MASS INDEX DOCD: CPT | Mod: CPTII,S$GLB,, | Performed by: INTERNAL MEDICINE

## 2024-12-02 PROCEDURE — 1160F RVW MEDS BY RX/DR IN RCRD: CPT | Mod: CPTII,S$GLB,, | Performed by: INTERNAL MEDICINE

## 2024-12-02 PROCEDURE — 1159F MED LIST DOCD IN RCRD: CPT | Mod: CPTII,S$GLB,, | Performed by: STUDENT IN AN ORGANIZED HEALTH CARE EDUCATION/TRAINING PROGRAM

## 2024-12-02 PROCEDURE — 3046F HEMOGLOBIN A1C LEVEL >9.0%: CPT | Mod: CPTII,S$GLB,, | Performed by: INTERNAL MEDICINE

## 2024-12-02 PROCEDURE — 4010F ACE/ARB THERAPY RXD/TAKEN: CPT | Mod: CPTII,S$GLB,, | Performed by: INTERNAL MEDICINE

## 2024-12-02 PROCEDURE — 4010F ACE/ARB THERAPY RXD/TAKEN: CPT | Mod: CPTII,S$GLB,, | Performed by: STUDENT IN AN ORGANIZED HEALTH CARE EDUCATION/TRAINING PROGRAM

## 2024-12-02 PROCEDURE — 3077F SYST BP >= 140 MM HG: CPT | Mod: CPTII,S$GLB,, | Performed by: INTERNAL MEDICINE

## 2024-12-02 PROCEDURE — 99204 OFFICE O/P NEW MOD 45 MIN: CPT | Mod: S$GLB,,, | Performed by: STUDENT IN AN ORGANIZED HEALTH CARE EDUCATION/TRAINING PROGRAM

## 2024-12-02 RX ORDER — BLOOD-GLUCOSE SENSOR
1 EACH MISCELLANEOUS
Qty: 3 EACH | Refills: 11 | Status: SHIPPED | OUTPATIENT
Start: 2024-12-02 | End: 2025-12-02

## 2024-12-02 RX ORDER — INSULIN GLARGINE 100 [IU]/ML
20 INJECTION, SOLUTION SUBCUTANEOUS NIGHTLY
Qty: 18 ML | Refills: 11 | Status: SHIPPED | OUTPATIENT
Start: 2024-12-02

## 2024-12-02 RX ORDER — TIRZEPATIDE 2.5 MG/.5ML
2.5 INJECTION, SOLUTION SUBCUTANEOUS
Qty: 4 PEN | Refills: 3 | Status: SHIPPED | OUTPATIENT
Start: 2024-12-02

## 2024-12-02 RX ORDER — LISINOPRIL AND HYDROCHLOROTHIAZIDE 20; 25 MG/1; MG/1
1 TABLET ORAL DAILY
Qty: 90 TABLET | Refills: 3 | Status: SHIPPED | OUTPATIENT
Start: 2024-12-02

## 2024-12-02 RX ORDER — PEN NEEDLE, DIABETIC 30 GX3/16"
NEEDLE, DISPOSABLE MISCELLANEOUS
Qty: 100 EACH | Refills: 3 | Status: SHIPPED | OUTPATIENT
Start: 2024-12-02

## 2024-12-02 NOTE — ASSESSMENT & PLAN NOTE
Uncontrolled HTN, management per PCP  Does not seem that anti-HTN regimen was adjusted  today by PCP. Will defer BP management to pcp. I emphasized to the pt importance of getting this under control as this will contribute to decreased kidney function.

## 2024-12-02 NOTE — ASSESSMENT & PLAN NOTE
On statin - Lipitor 40 mg but not taking consistently  He will be more consistent with this and we will repeat lipid panel before next visit  Likely will need to increase statin and possible also add additional medications for HLD

## 2024-12-02 NOTE — ASSESSMENT & PLAN NOTE
Uncontrolled T2DM since diagnosis years ago -- A1c has never been at goal  He has been only on half dose metformin with no other agents started until this morning when basal insulin was prescribed by his PCP just before his appt with me   He is not checking BG; does not know how to use glucometer  We discussed goal for A1c <7% with infrequent hypoglycemia  He has nephropathy and neuropathy. We discussed risks if DM remains uncontrolled - zee declining kidney function    Lantus 20u nightly prescribed this morning -- this is below a full weight based dose of basal insulin but it is a reasonable starting dose so I did not adjust this -- he can stat Lantus 20u qhs as his PCP rec.     We discussed starting Mounjaro 2.5 mg with slow up-titration based on SE after at least 4wks on each dose. We reviewed risks/benefits/SE. He has no contraindications.     Continue metformin - unsure why only half dose. Although his GFR is low, it is >45 so metformin dose does not need to be renally adjusted. Since we are starting mounjaro however, I will wait to increase metformin to minimize SE of too many changes at one time (zee since GI SE can be seen with both metformin and incretins)    We discussed he will likely need prandial insulin as well however we can start with basal and incretin for now.    Also not checking BG. We reviewed self monitoring BG. He will bring meter to DM ed visit to learn how to use it. Also we will start dexcom G7. Sample given today. He has an iPhone and does not need the . Schedule with DM ed for CGM set up.    We reviewed hypoglycemia precautions and 15-15 rule.    Eye dr scheduled in March. Refer to podiatry.    Repeat labs before next visit.

## 2024-12-02 NOTE — PROGRESS NOTES
Subjective     Patient ID: Jesusita De La Cruz is a 54 y.o. male.    Chief Complaint: Follow-up             History of Present Illness    CHIEF COMPLAINT:  Jesusita presents today for high blood pressure management.    HYPERTENSION:  He reports checking his blood pressure at home, with the first reading being 178/103. He has a family history of high blood pressure. He denies consuming salty foods and experiencing headaches or fatigue. He is not sure if he is taking lisinopril hydrochlorothiazide for management, last RF was June 2024.    DIABETES:  His recent labs showed high sugar levels, with an A1c of 13.3. He has not been taking insulin and is not interested in seeing Ms Wallace for diabetes management.    DENTAL HEALTH:  He reports experiencing dental problems for approximately three years, stating that one tooth has fallen out and another is about to fall out.    MEDICATIONS:  He is taking citalopram, atorvastatin, fenofibrate, metformin, and aspirin. He reports feeling better with anxiety medication.    FAMILY HISTORY:  He has a family history of high blood pressure, suggesting certain conditions may run in the family.    SOCIAL HISTORY:  He has two sons currently attending [employer] in [place]. He spent the recent holiday with all of his children.      ROS:  Constitutional: negative activity change, negative unexpected weight change, negative fatigue  HENT: negative trouble swallowing  Eyes: negative discharge, negative visual disturbance  Respiratory: negative chest tightness, negative wheezing, negative shortness of breath  Cardiovascular: negative chest pain, negative palpitations  Gastrointestinal: negative blood in stool, negative constipation, negative diarrhea, negative vomiting  Endocrine: negative polydipsia, negative polyuria  Genitourinary: negative difficulty urinating, negative dysuria, negative hematuria  Musculoskeletal: negative arthralgias, negative joint swelling, negative neck  pain  Neurological: negative weakness, negative headaches  Psychiatric/Behavioral: negative confusion, negative dysphoric mood  Psychiatric: +anxiety         HPI  Review of Systems     Objective     Physical Exam  Vitals reviewed.   Constitutional:       General: He is not in acute distress.     Appearance: Normal appearance. He is well-developed. He is obese. He is not ill-appearing, toxic-appearing or diaphoretic.   HENT:      Head: Normocephalic and atraumatic.   Eyes:      General: No scleral icterus.  Neck:      Thyroid: No thyromegaly.   Cardiovascular:      Rate and Rhythm: Normal rate and regular rhythm.      Heart sounds: Normal heart sounds. No murmur heard.     No friction rub. No gallop.   Pulmonary:      Effort: Pulmonary effort is normal. No respiratory distress.      Breath sounds: Normal breath sounds. No wheezing or rales.   Abdominal:      General: Bowel sounds are normal. There is no distension.      Palpations: Abdomen is soft. There is no mass.      Tenderness: There is no abdominal tenderness. There is no guarding or rebound.   Musculoskeletal:         General: Normal range of motion.      Cervical back: Normal range of motion.   Lymphadenopathy:      Cervical: No cervical adenopathy.   Skin:     Findings: No lesion.   Neurological:      Mental Status: He is alert and oriented to person, place, and time.   Psychiatric:         Mood and Affect: Mood normal.         Thought Content: Thought content normal.            Assessment and Plan     1. Type 2 diabetes mellitus with diabetic polyneuropathy, without long-term current use of insulin  -     Ambulatory referral/consult to Endocrinology; Future; Expected date: 12/09/2024  -     insulin glargine U-100, Lantus, (LANTUS SOLOSTAR U-100 INSULIN) 100 unit/mL (3 mL) InPn pen; Inject 20 Units into the skin every evening.  Dispense: 18 mL; Refill: 11  Appears to be cheaper than Toujeo  -     Ambulatory referral/consult to Ophthalmology; Future; Expected  date: 12/09/2024    2. Essential hypertension  -     lisinopriL-hydrochlorothiazide (PRINZIDE,ZESTORETIC) 20-25 mg Tab; Take 1 tablet by mouth once daily.  Dispense: 90 tablet; Refill: 3    3. Colon cancer screening  -     Cologuard Screening (Multitarget Stool DNA); Future; Expected date: 12/02/2024        Assessment & Plan    TYPE 2 DIABETES MELLITUS:  - Evaluated diabetes management, noting HbA1c of 13.3 indicating poor control.  - Determined need for insulin therapy to address severely elevated blood sugars.  - Assessed for diabetes-related complications including foot sores.  - Explained target blood sugar should be under 150 mg/dL.  - Discussed potential organ damage from prolonged elevated blood sugar.  - Educated on the importance of regular eye exams for patients with diabetes.  - Started Lantus (insulin glargine) pen for diabetes management.  - Continued metformin.  - Referred to endocrinology for diabetes management.  - Referred to ophthalmology for diabetic eye exam.    HYPERTENSION:  - Assessed patient's blood pressure, which was elevated at 150/90.  - Continued lisinopril/hydrochlorothiazide for blood pressure control.    HYPERLIPIDEMIA:  - Continued atorvastatin and fenofibrate.    ANXIETY DISORDER:  - Reviewed anxiety management and effectiveness of current medication.  - Continued citalopram (Celexa) for anxiety.    LONG TERM USE OF ASPIRIN:  - Continued aspirin.    GENERAL HEALTH MAINTENANCE:  - Ordered ColorGuard colon cancer screening test.    FOLLOW-UP:  - Follow up in 3 months unless issues arise sooner.  - Lanceing to send message via [facility] portal listing all current medications.                    Health Maintenance         Date Due Completion Date    Colorectal Cancer Screening Never done ---    Pneumococcal Vaccines (Age 0-64) (2 of 2 - PCV) 04/16/2020 4/16/2019    Foot Exam 04/16/2020 4/16/2019 (Done)    Override on 4/16/2019: Done    LDCT Lung Screen Never done ---    Shingles Vaccine  (1 of 2) Never done ---    Diabetes Urine Screening 08/19/2024 8/19/2023    Influenza Vaccine (1) Never done ---    COVID-19 Vaccine (4 - 2024-25 season) 09/01/2024 10/22/2021    Eye Exam 11/20/2024 11/20/2023    Hemoglobin A1c 11/30/2024 8/30/2024    Lipid Panel 08/30/2025 8/30/2024    Low Dose Statin 12/02/2025 12/2/2024    TETANUS VACCINE 04/16/2029 4/16/2019    RSV Vaccine (Age 60+ and Pregnant patients) (1 - 1-dose 75+ series) 01/29/2047 ---   Declines vaccines       Follow up in about 3 months (around 3/2/2025).    Visit today included increased complexity associated with the care of the episodic problem  addressed and managing the longitudinal care of the patient due to the serious and/or complex managed problem(s) .    Future Appointments   Date Time Provider Department Center   12/2/2024 11:00 AM Ct Austin MD [facility]   3/3/2025  8:00 AM Jonathan Boateng MD [facility]         This note was generated with the assistance of ambient listening technology. Verbal consent was obtained by the patient and accompanying visitor(s) for the recording of patient appointment to facilitate this note. I attest to having reviewed and edited the generated note for accuracy, though some syntax or spelling errors may persist. Please contact the author of this note for any clarification.

## 2024-12-02 NOTE — PATIENT INSTRUCTIONS
We will start a continuous glucose monitor - Dexcom G7. I will send this to your pharmacy -- if it is very expensive, don't pay for it and let me know. We can send it to a medical supply company instead in some cases.    We will schedule a diabetes education visit for help set up your     Continue Metformin 500 mg twice daily    Start Lantus 20 units nightly which was prescribed by your PCP today    Start Mounjaro 2.5 mg weekly and we will slowly increase this after at least 4 weeks on each dose if you are tolerating that lower dose.     Follow up with me in 3 months with fasting labs a few days before the appointment.    We will get you scheduled to see a diabetes educator in the next few weeks to teach you how to use the dexcom and review diet and other diabetes management topics.      Mounjaro start  We will start Mounjaro at 2.5 mg weekly. After 4 weeks if you tolerate it well, we typically increase to 5 mg weekly for improved blood sugar control as well as added weight loss benefit or we can continue 2.5 mg longer to help adjust to the medication. We will increase dose as we are able until we reach the maximum dose of 15 mg or the highest dose that you tolerate. We can increase the dose as often as every month or increase more slowly if needed    The pen is good out of the refrigerator for up to 21 days. Please remove the pen from the refrigerator for 10 minutes before taking injection    Potential Side Effects of Mounjaro:   One of the ways this medication works is by decreasing how fast your stomach empties, which makes you feel full faster after you eat and should help you lose weight. The main side-effects are related to GI upset, such as nausea, bloating and abdominal cramps. You can usually avoid this by eating slowly (take half of your normal portion and eat it over 30 minutes). If you do experience nausea, it does tend to get better after the first few weeks. The most severe reaction is acute  "pancreatitis which can cause severe abdominal pain radiating to your back. If you experience this, stop the medication and go to the ER. Fortunately this is very rare.      "15-15 Rule" for hypoglycemia treatment  - Many people tend to want to eat as much as they can for low blood glucose until they feel better. This can cause blood glucose levels to go very high, which is bad. Using the step-wise approach of the "15-15 Rule" can help you correct a low blood glucose while also preventing subsequent high blood glucose levels  - If you experience an episode of hypoglycemia (glucose less than 70), please follow the "15-15 rule": Take 15 grams of carbohydrate (see examples below) to raise your blood sugar and recheck it after 15 minutes. If still below 70 mg/dL, take another 15 gram serving of carbohydrate  -Repeat these steps until your blood sugar is at least 70 mg/dL. Once your blood sugar is back to normal, eat a small serving of protein to make sure it doesnt lower again    -Examples of 15g of carbohydrate:  4 ounces (1/2 cup) of juice or regular soda (not diet)  1 tablespoon of sugar, honey, or corn syrup  Hard candies, jellybeans, or gumdrops--see food label for how many to consume  Make a note about any episodes of low blood sugar and talk with your health care team if they are recurrent     - Note: When treating a low blood glucose, the choice of carbohydrate source is important. Complex carbohydrates, or foods that contain fats along with carbs (like chocolate) can slow the absorption of glucose and should not be used to treat an emergency low    - For more information, please visit:  [email or web address]        "

## 2024-12-02 NOTE — PROGRESS NOTES
Endocrinology New Visit   12/02/2024      Subjective:      Chief Complaint:  Diabetes      History of Present Illness  Jesusita De La Cruz is a 54 y.o. male with HTN, HLD, obesity, and T2DM referred by Dr. Jonathan Boateng for evaluation of T2DM.    Saw PCP this AM and was prescribed basal insulin - not yet started it.    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)    Recent Hgb A1C:  Lab Results   Component Value Date    HGBA1C 13.3 (H) 08/30/2024       Glucose Monitoring:  Not checking, he has a new glucometer and testing supplies but does not know how to use it -- he will ask a diabetic family member or the pharmacist to show him    Hypoglycemic Episodes: denies sx of hypoglycemia; not checking BG    Screening / DM Complications:    Nephropathy:  ACEi/ARB: Taking Lisinopril-HCTZ combo - HTN managed by his PCP  Lab Results   Component Value Date    MICALBCREAT 1909.0 (H) 08/19/2023       Last Lipid Panel:  Statin: Taking Lipitor 40 mg -- been taking inconsistently does not always remember   Lab Results   Component Value Date    LDLCALC Invalid, Trig>400.0 08/30/2024       Last foot exam Most Recent Foot Exam Date: Not Found; he is very careful with monitoring his feet, will see podiatry annually   Last eye exam : 11/20/2023;  no laser surgery or DR that he is aware of, scheduled with optometry in March 2025    B12:  Lab Results   Component  "Value Date    [id number] 05/24/2017           ROS:   As above    Objective:     BP (!) 164/90 (BP Location: Right arm, Patient Position: Sitting)   Pulse 89   Ht 6' 1" (1.854 m)   Wt 117 kg (257 lb 13.3 oz)   SpO2 96%   BMI 34.02 kg/m²   BP Readings from Last 3 Encounters:   12/02/24 (!) 164/90   12/02/24 (!) 150/90   08/30/24 (!) 145/95     Wt Readings from Last 1 Encounters:   12/02/24 1051 117 kg (257 lb 13.3 oz)     Body mass index is 34.02 kg/m².  Physical Exam  Vitals reviewed.   Constitutional:       General: He is not in acute distress.     Appearance: He is obese. He is not ill-appearing.   HENT:      Head: Normocephalic.      Mouth/Throat:      Mouth: Mucous membranes are moist.   Eyes:      Conjunctiva/sclera: Conjunctivae normal.   Pulmonary:      Effort: Pulmonary effort is normal.   Neurological:      Mental Status: He is oriented to person, place, and time.   Psychiatric:         Mood and Affect: Mood normal.         Behavior: Behavior normal.       Lab Review:   Lab Results   Component Value Date    HGBA1C 13.3 (H) 08/30/2024     Lab Results   Component Value Date    CHOL 277 (H) 08/30/2024    HDL 17 (L) 08/30/2024    LDLCALC Invalid, Trig>400.0 08/30/2024    TRIG 1,789 (H) 08/30/2024    CHOLHDL 6.1 (L) 08/30/2024     Lab Results   Component Value Date     08/30/2024    K 3.9 08/30/2024     08/30/2024    CO2 18 (L) 08/30/2024     (H) 08/30/2024    BUN 19 08/30/2024    CREATININE 1.5 (H) 08/30/2024    CALCIUM 10.4 08/30/2024    PROT 9.2 (H) 08/30/2024    ALBUMIN 4.1 08/30/2024    BILITOT 0.5 08/30/2024    ALKPHOS 88 08/30/2024    AST 24 08/30/2024    ALT 13 08/30/2024    ANIONGAP 17 (H) 08/30/2024    ESTGFRAFRICA >60.0 02/15/2021    EGFRNONAA >60.0 02/15/2021    TSH 1.741 05/24/2017     Vit D, 25-Hydroxy   Date Value Ref Range Status   05/24/2017 15 (L) 30 - 96 ng/mL Final     Comment:     Vitamin D deficiency.........<10 ng/mL                              Vitamin D " insufficiency......10-29 ng/mL       Vitamin D sufficiency........> or equal to 30 ng/mL  Vitamin D toxicity............>100 ng/mL           All relevant labs and imaging reviewed.    Assessment and Plan     Type 2 diabetes mellitus with diabetic polyneuropathy, without long-term current use of insulin  Uncontrolled T2DM since diagnosis years ago -- A1c has never been at goal  He has been only on half dose metformin with no other agents started until this morning when basal insulin was prescribed by his PCP just before his appt with me   He is not checking BG; does not know how to use glucometer  We discussed goal for A1c <7% with infrequent hypoglycemia  He has nephropathy and neuropathy. We discussed risks if DM remains uncontrolled - zee declining kidney function    Lantus 20u nightly prescribed this morning -- this is below a full weight based dose of basal insulin but it is a reasonable starting dose so I did not adjust this -- he can stat Lantus 20u qhs as his PCP rec.     We discussed starting Mounjaro 2.5 mg with slow up-titration based on SE after at least 4wks on each dose. We reviewed risks/benefits/SE. He has no contraindications.     Continue metformin - unsure why only half dose. Although his GFR is low, it is >45 so metformin dose does not need to be renally adjusted. Since we are starting mounjaro however, I will wait to increase metformin to minimize SE of too many changes at one time (zee since GI SE can be seen with both metformin and incretins)    We discussed he will likely need prandial insulin as well however we can start with basal and incretin for now.    Also not checking BG. We reviewed self monitoring BG. He will bring meter to DM ed visit to learn how to use it. Also we will start dexcom G7. Sample given today. He has an iPhone and does not need the . Schedule with DM ed for CGM set up.    We reviewed hypoglycemia precautions and 15-15 rule.    Eye dr scheduled in March. Refer to  podiatry.    Repeat labs before next visit.     Essential hypertension  Uncontrolled HTN, management per PCP  Does not seem that anti-HTN regimen was adjusted  today by PCP. Will defer BP management to pcp. I emphasized to the pt importance of getting this under control as this will contribute to decreased kidney function.    Hypertriglyceridemia  On statin - Lipitor 40 mg but not taking consistently  He will be more consistent with this and we will repeat lipid panel before next visit  Likely will need to increase statin and possible also add additional medications for HLD      Diabetes education visit soon  RTC in 3mo with fasting labs and urine before the appt - A1c, lipid, renal panel, and urine micro      Ct Austin MD  [facility]    Visit today included increased complexity associated with the care of the problems addressed and managing the longitudinal care of the patient due to the serious and/or complex managed problems

## 2024-12-20 ENCOUNTER — CLINICAL SUPPORT (OUTPATIENT)
Dept: DIABETES | Facility: CLINIC | Age: 54
End: 2024-12-20
Payer: COMMERCIAL

## 2024-12-20 VITALS — BODY MASS INDEX: 33.16 KG/M2 | WEIGHT: 251.31 LBS

## 2024-12-20 DIAGNOSIS — E11.42 TYPE 2 DIABETES MELLITUS WITH DIABETIC POLYNEUROPATHY, WITHOUT LONG-TERM CURRENT USE OF INSULIN: ICD-10-CM

## 2024-12-20 PROCEDURE — G0108 DIAB MANAGE TRN  PER INDIV: HCPCS | Mod: S$GLB,,, | Performed by: INTERNAL MEDICINE

## 2024-12-20 PROCEDURE — 99999 PR PBB SHADOW E&M-EST. PATIENT-LVL II: CPT | Mod: PBBFAC,,,

## 2025-01-02 ENCOUNTER — CLINICAL SUPPORT (OUTPATIENT)
Dept: DIABETES | Facility: CLINIC | Age: 55
End: 2025-01-02
Payer: COMMERCIAL

## 2025-01-02 VITALS — WEIGHT: 256.5 LBS | BODY MASS INDEX: 33.84 KG/M2

## 2025-01-02 DIAGNOSIS — E11.42 TYPE 2 DIABETES MELLITUS WITH DIABETIC POLYNEUROPATHY, WITHOUT LONG-TERM CURRENT USE OF INSULIN: Primary | ICD-10-CM

## 2025-01-02 PROCEDURE — G0108 DIAB MANAGE TRN  PER INDIV: HCPCS | Mod: S$GLB,,, | Performed by: INTERNAL MEDICINE

## 2025-01-02 PROCEDURE — 99999 PR PBB SHADOW E&M-EST. PATIENT-LVL I: CPT | Mod: PBBFAC,,,

## 2025-01-02 NOTE — PROGRESS NOTES
Diabetes Care Specialist Progress Note  Author: Baylee Pace RN, CDE  Date: 12/20/2024    Intake    Program Intake  Reason for Diabetes Program Visit:: Intervention  Type of Intervention:: Individual  Individual: Device Training  Device Training: Personal CGM  Current diabetes risk level:: high  In the last month, have you used the ER or been admitted to the hospital: No  Permission to speak with others about care:: no    Current Diabetes Treatment: Oral Medications, Insulin, DM Injectables  Oral Medication Type/Dose: metformin 500 mg  DM Injectables Type/Dose: Mounjaro 2.5 mg  Method of insulin delivery?: Injections  Injection Type: Pens  Pen Type/Dose: Lantus 20 units daily (not started)    Continuous Glucose Monitoring  Patient has CGM: No    Lab Results   Component Value Date    HGBA1C 13.3 (H) 08/30/2024       Weight: 114 kg (251 lb 5.2 oz)       Body mass index is 33.16 kg/m².    Lifestyle Coping Support & Clinical    Lifestyle/Coping/Support  Compared to other people your age, how would you rate your health?: Fair  Does anyone in your family have diabetes or does anyone in your family support you in your diabetes care?: siblings  How do you deal with stress/distress?: support from family  Learning Barriers:: None  Culture or Worship beliefs that may impact ability to access healthcare: No  Psychosocial/Coping Skills Assessment Completed: : Yes  Assessment indicates:: Adequate understanding  Area of need?: No    Problem Review  Active Comorbidities: Hypertension, Hyperlipidemia/Dyslipidemia    Diabetes Self-Management Skills Assessment    Medication Skills Assessment  Patient is able to identify current diabetes medications, dosages, and appropriate timing of medications.: yes  Patient reports problems or concerns with current medication regimen.: no  Patient is  aware that some diabetes medications can cause low blood sugar?: Yes  Medication Skills Assessment Completed:: Yes  Assessment indicates::  Knowledge deficit  Area of need?: Yes    Diabetes Disease Process/Treatment Options  Diabetes Type?: Type II  When were you diagnosed?: 12 yrs ago  Is patient aware of what causes diabetes?: Yes  Does patient understand the pathophysiology of diabetes?: Yes  Diabetes Disease Process/Treatment Options: Skills Assessment Completed: Yes  Assessment indicates:: Adequate understanding  Area of need?: Yes    Nutrition/Healthy Eating  Nutrition/Healthy Eating Skills Assessment Completed:: No  Deffered due to:: Time    Physical Activity/Exercise  Physical Activity/Exercise Skills Assessment Completed: : No  Deffered due to:: Time    Home Blood Glucose Monitoring  Patient states that blood sugar is checked at home daily.: no  What is your A1c Target?: 7%  Home Blood Glucose Monitoring Skills Assessment Completed: : Yes  Assessment indicates:: Instruction Needed (Dexcom G7 training)  Area of need?: Yes    Acute Complications  Acute Complications Skills Assessment Completed: : No  Deffered due to:: Time    Chronic Complications  Chronic Complications Skills Assessment Completed: : No  Deferred due to:: Time      Assessment Summary and Plan    Based on today's diabetes care assessment, the following areas of need were identified:      Identified Areas of Need      Medication/Current Diabetes Treatment: Yes, reviewed MOA of Lantus; pt had taken insulin on the past; Advised to  RX from pharmacy and start immediately   Lifestyle Coping/Support: No   Diabetes Disease Process/Treatment Options: Yes, Discussed progression of DM disease, long term complications, and tx options. Reviewed A1c and BG goals. Reviewed hypoglycemia, s/s, and appropriate tx options.   Nutrition/Healthy Eating: Address on f/u visit   Physical Activity/Exercise: Address on f/u visit   Home Blood Glucose Monitoring: Yes    Acute Complications: Address on f/u visit   Chronic Complications: Address on f/u visit       Today's interventions were provided  through individual discussion, instruction, and written materials were provided.      Patient verbalized understanding of instruction and written materials.  Pt was able to return back demonstration of instructions today. Patient understood key points, needs reinforcement and further instruction.     Diabetes Self-Management Care Plan:    Today's Diabetes Self-Management Care Plan was developed with Jesusita's input. Jesusita has agreed to work toward the following goal(s) to improve his/her overall diabetes control.      Care Plan: Diabetes Management   Updates made since 1/3/2024 12:00 AM        Problem: Blood Glucose Self-Monitoring         Goal: Patient agrees to check and record blood sugars using the Dexcom G7 system    Start Date: 12/20/2024   Expected End Date: 1/2/2025   Priority: High   Barriers: Knowledge deficit   Note:    Trained on Dexcom G7. Connected to [facility].               Follow Up Plan     F/u on 1/2/2025; complete assessment, review Dexcom data    Today's care plan and follow up schedule was discussed with patient.  Angeloeing verbalized understanding of the care plan, goals, and agrees to follow up plan.        The patient was encouraged to communicate with his/her health care provider/physician and care team regarding his/her condition(s) and treatment.  I provided the patient with my contact information today and encouraged to contact me via phone or [facility] as needed.     Length of Visit   Total Time: 60 Minutes

## 2025-01-02 NOTE — PROGRESS NOTES
Diabetes Care Specialist Follow-up Note  Author: Baylee Pace RN, CDE  Date: 1/2/2025    Intake    Program Intake  Reason for Diabetes Program Visit:: Initial Diabetes Assessment  Type of Intervention:: Individual  Individual: Education  Education: Self-Management Skill Review, Nutrition and Meal Planning  Current diabetes risk level:: high  In the last month, have you used the ER or been admitted to the hospital: No  Permission to speak with others about care:: no    Current Diabetes Treatment: Oral Medications, Insulin, DM Injectables  Oral Medication Type/Dose: metformin 500 mg  DM Injectables Type/Dose: Mounjaro 2.5 mg  Method of insulin delivery?: Injections  Injection Type: Pens  Pen Type/Dose: Lantus 20 units daily (not started)    Continuous Glucose Monitoring  Patient has CGM: Yes  Personal CGM type:: Dexcom G7  GMI Date: 01/02/25  GMI Value: 8.9 %    Lab Results   Component Value Date    HGBA1C 13.3 (H) 08/30/2024     Weight: 116.3 kg (256 lb 8.1 oz)       Body mass index is 33.84 kg/m².    Lifestyle Coping Support & Clinical    Lifestyle/Coping/Support  Compared to other people your age, how would you rate your health?: Fair    Problem Review  Active Comorbidities: Hypertension, Hyperlipidemia/Dyslipidemia    Diabetes Self-Management Skills Assessment    Medication Skills Assessment  Patient is able to identify current diabetes medications, dosages, and appropriate timing of medications.: yes  Patient reports problems or concerns with current medication regimen.: no  Patient is  aware that some diabetes medications can cause low blood sugar?: Yes  Medication Skills Assessment Completed:: Yes  Assessment indicates:: Knowledge deficit  Area of need?: Yes         Nutrition/Healthy Eating  Meal Plan 24 Hour Recall - Breakfast: 2 eggs 1/2 fabby  Meal Plan 24 Hour Recall - Lunch: sandwich  Meal Plan 24 Hour Recall - Dinner: often orders out due to time constrainst; has stoped eating rice recently  Meal Plan  24 Hour Recall - Snack: varies  Meal Plan 24 Hour Recall - Beverage: Water  Who shops/cooks?: patient and spouse  Patient can identify foods that impact blood sugar.: yes  Challenges to healthy eating:: eating out, going to parties, portion control  Nutrition/Healthy Eating Skills Assessment Completed:: Yes  Assessment indicates:: Instruction Needed  Area of need?: Yes    Physical Activity/Exercise  Patient's daily activity level:: lightly active  Patient formally exercises outside of work.: yes  Patient can identify forms of physical activity.: yes  Physical Activity/Exercise Skills Assessment Completed: : Yes  Assessment indicates:: Adequate understanding  Area of need?: No    Home Blood Glucose Monitoring  Personal CGM type:: Dexcom G7    Acute Complications  Have you ever had hypoglycemia (low BG 70 or less)?: no  Do you know the symptoms of low blood sugar and how to treat?: no  Have you ever had hyperglycemia (high  or more)?: yes  How often and what are your symptoms?: a few times per week  Have you ever had DKA?: no  Do you ever test for ketones?: no  Do you have a sick day plan?: no  Acute Complications Skills Assessment Completed: : Yes  Assessment indicates:: Knowledge deficit  Area of need?: Yes    Chronic Complications  Reviewed health maintenance: yes  Have you completed your annual diabetes maintenance labwork? : yes  Chronic Complications Skills Assessment Completed: : Yes  Assessment indicates:: Knowledge deficit  Area of need?: Yes      During today's follow-up visit,  the following areas required further assessment and content was provided/reviewed.    Based on today's diabetes care assessment, the following areas of need were identified:      Identified Areas of Need      Medication/Current Diabetes Treatment: Yes, see care planning; Advised to get Lantus filled today   Lifestyle Coping/Support:  No   Diabetes Disease Process/Treatment Options:  Discussed progression of DM disease, long  term complications, and tx options. Reviewed A1c and BG goals. Reviewed hypoglycemia, s/s, and appropriate tx options   Nutrition/Healthy Eating: Yes, see care planning   Physical Activity/Exercise: No    Home Blood Glucose Monitoring: Yes, see care planning   Acute Complications: Yes, Reviewed hypoglycemia, s/s and appropriate tx. Have/get quick acting glucose tablets at hand.    Chronic Complications: Yes, Discussed long term complications of diabetes. Patient agrees to have the following diabetes glenn maintenance screenings/appointments yearly eye exams, foot exams.        Today's interventions were provided through individual discussion, instruction, and written materials were provided.    Patient verbalized understanding of instruction and written materials.  Pt was able to return back demonstration of instructions today. Patient understood key points, needs reinforcement and further instruction.     Diabetes Self-Management Care Plan Review and Evaluation of Progress:    During today's follow-up Jesusita's Diabetes Self-Management Care Plan progress was reviewed and progress was evaluated including his/her input. Jesusita has agreed to continue his/her journey to improve/maintain overall diabetes control by continuing to set health goals. See care plan progress below.      Care Plan: Diabetes Management   Updates made since 1/3/2024 12:00 AM        Problem: Blood Glucose Self-Monitoring         Goal: Patient agrees to check and record blood sugars using the Dexcom G7 system    Start Date: 12/20/2024   Expected End Date: 1/2/2025   This Visit's Progress: On track   Priority: Low   Barriers: Knowledge deficit   Note:    Trained on Dexcom G7. Connected to [facility].   User name [phone]  P/W [email or web address]    Update to care planning 01/02/2025:  Download reviewed. Global hyperglycemia.          Problem: Medications         Goal: Patient Agrees to  Lantus from pharmacy and start ASAP    Start Date:  1/2/2025   Expected End Date: 1/16/2025   Priority: High   Barriers: Lack of Motivation to Change   Note:    Patient had not started his Lantus insulin.  Reviewed CGM which shows global hyperglycemia.  Urged pt to start the insulin as soon as possible.  He is taking Mounjaro and tolerating well.  Will review CGM in 2 weeks for any changes.          Problem: Healthy Eating         Goal: Patient will begin meal prepping at home and decrease the amount of fast foods/restaurant style foods eaten.    Start Date: 1/2/2025   Priority: Medium   Barriers: Lack of Motivation to Change   Note:    Suggested meal planning to include source of protein and vegetable to diet.  Suggested bringing lunch to work.         Task: Reviewed the sources and role of Carbohydrate, Protein, and Fat and how each nutrient impacts blood sugar. Completed 1/2/2025        Task: Provided visual examples using dry measuring cups, food models, and other familiar objects such as computer mouse, deck or cards, tennis ball etc. to help with visualization of portions. Completed 1/2/2025        Task: Explained how to count carbohydrates using the food label and the use of dry measuring cups for accurate carb counting. Completed 1/2/2025        Task: Discussed strategies for choosing healthier menu options when dining out. Completed 1/2/2025        Task: Recommended replacing beverages containing high sugar content with noncaloric/sugar free options and/or water. Completed 1/2/2025        Task: Review the importance of balancing carbohydrates with each meal using portion control techniques to count servings of carbohydrate and label reading to identify serving size and amount of total carbs per serving. Completed 1/2/2025        Task: Provided Sample plate method and reviewed the use of the plate to estimate amounts of carbohydrate per meal. Completed 1/2/2025          Follow Up Plan     F/u in 2 wks; reminder set to review Dexcom on 1/16/24. Patient will be  back at work with limited time for appointments    Today's care plan and follow up schedule was discussed with patient.  Lanceing verbalized understanding of the care plan, goals, and agrees to follow up plan.        The patient was encouraged to communicate with his/her health care provider/physician and care team regarding his/her condition(s) and treatment.  I provided the patient with my contact information today and encouraged to contact me via phone or [facility] as needed.     Length of Visit   Total Time: 30 Minutes

## 2025-03-10 ENCOUNTER — TELEPHONE (OUTPATIENT)
Dept: PHARMACY | Facility: CLINIC | Age: 55
End: 2025-03-10
Payer: COMMERCIAL

## 2025-03-11 NOTE — TELEPHONE ENCOUNTER
[facility]/Population Health Chart Review & Patient Outreach Details For Medication Adherence Project    Reason for Outreach Encounter: 3rd Party payor non-compliance report ([employer], BCBS, C, etc)  2.  Patient Outreach Method: [email or web address] message  3.   Medication in question: metformin, prinzide, atorvastatin     Diabetes Medications              insulin glargine U-100, Lantus, (LANTUS SOLOSTAR U-100 INSULIN) 100 unit/mL (3 mL) InPn pen Inject 20 Units into the skin every evening.    metFORMIN (GLUCOPHAGE) 500 MG tablet Take 1 tablet (500 mg total) by mouth 2 (two) times daily with meals.    tirzepatide (MOUNJARO) 2.5 mg/0.5 mL PnIj Inject 2.5 mg into the skin every 7 days.              Hypertension Medications              lisinopriL-hydrochlorothiazide (PRINZIDE,ZESTORETIC) 20-25 mg Tab Take 1 tablet by mouth once daily.              Hyperlipidemia Medications              atorvastatin (LIPITOR) 40 MG tablet Take 1 tablet (40 mg total) by mouth once daily.    fenofibrate 160 MG Tab Take 1 tablet (160 mg total) by mouth once daily.               4.  Reviewed and or Updates Made To: Patient Chart  5. Outreach Outcomes and/or actions taken: Sent inquiry to patient: Waiting for response.    No

## 2025-03-28 ENCOUNTER — TELEPHONE (OUTPATIENT)
Dept: INTERNAL MEDICINE | Facility: CLINIC | Age: 55
End: 2025-03-28
Payer: COMMERCIAL

## 2025-03-28 NOTE — TELEPHONE ENCOUNTER
Called pt no answer. Left massage, dr. Boateng wont be I clinic for tomorrow 3/29 we need to reschedule his 3 month f/u. To give us a call back.   Cardiac

## 2025-03-28 NOTE — TELEPHONE ENCOUNTER
----- Message from Mark sent at 3/28/2025 10:49 AM CDT -----  Regarding: Saturday Apt? - Callback Request  Contact: Patient [phone]  .[email or web address] Patient is calling for Medical Advice regarding: Patient was set up with apt for Dr. Boateng for Saturday and he was told he needed to reschedule this appointment. He says that he wanted another Saturday if possible, but I did not have anything available with Dr. Boateng for a Saturday. 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 So, he wanted to speak to office about this and reschedule for another Saturday. Please reach out via [facility] to discuss options of schedulingHow long has patient had these symptoms:Pharmacy name and phone#:Patient wants a call back or thru [facility]: CallComments:Please advise patient replies from provider may take up to 48 hours.

## 2025-04-14 ENCOUNTER — TELEPHONE (OUTPATIENT)
Dept: PHARMACY | Facility: CLINIC | Age: 55
End: 2025-04-14
Payer: COMMERCIAL

## 2025-04-14 NOTE — TELEPHONE ENCOUNTER
[facility]/Population Health Chart Review & Patient Outreach Details For Medication Adherence Project    Reason for Outreach Encounter: 3rd Party payor non-compliance report ([employer], BCBS, C, etc)  2.  Patient Outreach Method: [employer] message  3.   Medication in question: prinzide   LAST FILLED: 12/2/24 for 90 day supply  Hypertension Medications              lisinopriL-hydrochlorothiazide (PRINZIDE,ZESTORETIC) 20-25 mg Tab Take 1 tablet by mouth once daily.              4.  Reviewed and or Updates Made To: Patient Chart  5. Outreach Outcomes and/or actions taken: Sent inquiry to patient: Waiting for response.

## 2025-04-25 ENCOUNTER — TELEPHONE (OUTPATIENT)
Dept: PHARMACY | Facility: CLINIC | Age: 55
End: 2025-04-25
Payer: COMMERCIAL

## 2025-04-25 NOTE — TELEPHONE ENCOUNTER
[facility]/Population Health Chart Review & Patient Outreach Details For Medication Adherence Project    Reason for Outreach Encounter: 3rd Party payor non-compliance report ([employer], BCBS, C, etc)  2.  Patient Outreach Method: Reviewed patient chart  and [employer] message  3.   Medication in question:    Hyperlipidemia Medications              atorvastatin (LIPITOR) 40 MG tablet Take 1 tablet (40 mg total) by mouth once daily.    fenofibrate 160 MG Tab Take 1 tablet (160 mg total) by mouth once daily.                  atorvastatin  last filled  8/30 for 90 day supply      4.  Reviewed and or Updates Made To: Patient Chart  5. Outreach Outcomes and/or actions taken: Sent inquiry to patient: Waiting for response  Additional Notes:

## 2025-05-02 ENCOUNTER — PATIENT OUTREACH (OUTPATIENT)
Dept: ADMINISTRATIVE | Facility: HOSPITAL | Age: 55
End: 2025-05-02
Payer: COMMERCIAL

## 2025-05-02 NOTE — PROGRESS NOTES
Chart reviewed and updated. Reconciled immunizations, health maintenance and care everywhere.  Scheduled A1c and microalbumin on upcoming appt.      [facility]  Panel Care Coordinator  [facility]  [phone]  Jonathan French MD

## 2025-05-14 ENCOUNTER — TELEPHONE (OUTPATIENT)
Dept: PHARMACY | Facility: CLINIC | Age: 55
End: 2025-05-14
Payer: COMMERCIAL

## 2025-05-14 NOTE — TELEPHONE ENCOUNTER
[facility]/Population Health Chart Review & Patient Outreach Details For Medication Adherence Project    Reason for Outreach Encounter: 3rd Party payor non-compliance report (Hum[employer], BCBS, UHC, etc)  2.  Patient Outreach Method: Reviewed patient chart   3.   Medication in question:    Hypertension Medications              lisinopriL-hydrochlorothiazide (PRINZIDE,ZESTORETIC) 20-25 mg Tab Take 1 tablet by mouth once daily.                 Lisinopril/hctz  last filled  4/29 for 90 day supply      4.  Reviewed and or Updates Made To: Patient Chart  5. Outreach Outcomes and/or actions taken: Patient filled medication and is on track to be adherent  Additional Notes:

## 2025-05-25 ENCOUNTER — TELEPHONE (OUTPATIENT)
Dept: PHARMACY | Facility: CLINIC | Age: 55
End: 2025-05-25
Payer: COMMERCIAL

## 2025-05-25 NOTE — TELEPHONE ENCOUNTER
[facility]Population [facility] Chart Review & Patient Outreach Details For Medication Adherence Project    Reason for Outreach Encounter: 3rd Party payor non-compliance report (Hum[employer], BCBS, C, etc)  2.  Patient Outreach Method: Reviewed patient chart  and [employer] message  3.   Medication in question:    Hyperlipidemia Medications              atorvastatin (LIPITOR) 40 MG tablet Take 1 tablet (40 mg total) by mouth once daily.    fenofibrate 160 MG Tab Take 1 tablet (160 mg total) by mouth once daily.               LF 90 ds 8/30/24    4.  Reviewed and or Updates Made To: Patient Chart  5. Outreach Outcomes and/or actions taken: Sent inquiry to patient: Waiting for response  Additional Notes:

## 2025-06-09 ENCOUNTER — TELEPHONE (OUTPATIENT)
Dept: PHARMACY | Facility: CLINIC | Age: 55
End: 2025-06-09
Payer: COMMERCIAL

## 2025-06-10 NOTE — TELEPHONE ENCOUNTER
[facility]Population [facility] Chart Review & Patient Outreach Details For Medication Adherence Project    Reason for Outreach Encounter: 3rd Party payor non-compliance report (Hum[employer], BCBS, C, etc)  2.  Patient Outreach Method: Reviewed patient chart  and [employer] message  3.   Medication in question:    Hyperlipidemia Medications              atorvastatin (LIPITOR) 40 MG tablet Take 1 tablet (40 mg total) by mouth once daily.    fenofibrate 160 MG Tab Take 1 tablet (160 mg total) by mouth once daily.                  LF 90 ds 8/30/24    4.  Reviewed and or Updates Made To: Patient Chart  5. Outreach Outcomes and/or actions taken: Sent inquiry to patient: Waiting for response  Additional Notes:

## 2025-06-15 ENCOUNTER — TELEPHONE (OUTPATIENT)
Dept: PHARMACY | Facility: CLINIC | Age: 55
End: 2025-06-15
Payer: COMMERCIAL

## 2025-06-16 ENCOUNTER — OFFICE VISIT (OUTPATIENT)
Dept: INTERNAL MEDICINE | Facility: CLINIC | Age: 55
End: 2025-06-16
Payer: COMMERCIAL

## 2025-06-16 ENCOUNTER — LAB VISIT (OUTPATIENT)
Dept: LAB | Facility: HOSPITAL | Age: 55
End: 2025-06-16
Attending: INTERNAL MEDICINE
Payer: COMMERCIAL

## 2025-06-16 ENCOUNTER — PATIENT MESSAGE (OUTPATIENT)
Dept: ADMINISTRATIVE | Facility: HOSPITAL | Age: 55
End: 2025-06-16
Payer: COMMERCIAL

## 2025-06-16 VITALS
BODY MASS INDEX: 33.74 KG/M2 | WEIGHT: 255.75 LBS | DIASTOLIC BLOOD PRESSURE: 90 MMHG | SYSTOLIC BLOOD PRESSURE: 150 MMHG

## 2025-06-16 DIAGNOSIS — F41.9 ANXIETY: ICD-10-CM

## 2025-06-16 DIAGNOSIS — E11.42 TYPE 2 DIABETES MELLITUS WITH DIABETIC POLYNEUROPATHY, WITHOUT LONG-TERM CURRENT USE OF INSULIN: ICD-10-CM

## 2025-06-16 DIAGNOSIS — N52.9 ERECTILE DYSFUNCTION, UNSPECIFIED ERECTILE DYSFUNCTION TYPE: ICD-10-CM

## 2025-06-16 DIAGNOSIS — Z12.5 SCREENING FOR MALIGNANT NEOPLASM OF PROSTATE: ICD-10-CM

## 2025-06-16 DIAGNOSIS — E78.1 HYPERTRIGLYCERIDEMIA: ICD-10-CM

## 2025-06-16 DIAGNOSIS — I10 ESSENTIAL HYPERTENSION: Primary | ICD-10-CM

## 2025-06-16 LAB
ABSOLUTE EOSINOPHIL (OHS): 0.13 K/UL
ABSOLUTE MONOCYTE (OHS): 0.36 K/UL (ref 0.3–1)
ABSOLUTE NEUTROPHIL COUNT (OHS): 2.6 K/UL (ref 1.8–7.7)
ALBUMIN SERPL BCP-MCNC: 4.2 G/DL (ref 3.5–5.2)
ALP SERPL-CCNC: 58 UNIT/L (ref 40–150)
ALT SERPL W/O P-5'-P-CCNC: 17 UNIT/L (ref 10–44)
ANION GAP (OHS): 13 MMOL/L (ref 8–16)
AST SERPL-CCNC: 17 UNIT/L (ref 11–45)
BASOPHILS # BLD AUTO: 0.05 K/UL
BASOPHILS NFR BLD AUTO: 1 %
BILIRUB SERPL-MCNC: 0.2 MG/DL (ref 0.1–1)
BUN SERPL-MCNC: 27 MG/DL (ref 6–20)
CALCIUM SERPL-MCNC: 9.9 MG/DL (ref 8.7–10.5)
CHLORIDE SERPL-SCNC: 103 MMOL/L (ref 95–110)
CHOLEST SERPL-MCNC: 243 MG/DL (ref 120–199)
CHOLEST/HDLC SERPL: 12.2 {RATIO} (ref 2–5)
CO2 SERPL-SCNC: 20 MMOL/L (ref 23–29)
CREAT SERPL-MCNC: 1.2 MG/DL (ref 0.5–1.4)
EAG (OHS): 235 MG/DL (ref 68–131)
ERYTHROCYTE [DISTWIDTH] IN BLOOD BY AUTOMATED COUNT: 13 % (ref 11.5–14.5)
GFR SERPLBLD CREATININE-BSD FMLA CKD-EPI: >60 ML/MIN/1.73/M2
GLUCOSE SERPL-MCNC: 310 MG/DL (ref 70–110)
HBA1C MFR BLD: 9.8 % (ref 4–5.6)
HCT VFR BLD AUTO: 38.6 % (ref 40–54)
HDLC SERPL-MCNC: 20 MG/DL (ref 40–75)
HDLC SERPL: 8.2 % (ref 20–50)
HGB BLD-MCNC: 13.6 GM/DL (ref 14–18)
IMM GRANULOCYTES # BLD AUTO: 0.04 K/UL (ref 0–0.04)
IMM GRANULOCYTES NFR BLD AUTO: 0.8 % (ref 0–0.5)
LDLC SERPL CALC-MCNC: ABNORMAL MG/DL
LYMPHOCYTES # BLD AUTO: 1.6 K/UL (ref 1–4.8)
MCH RBC QN AUTO: 29.8 PG (ref 27–31)
MCHC RBC AUTO-ENTMCNC: 35.2 G/DL (ref 32–36)
MCV RBC AUTO: 85 FL (ref 82–98)
NONHDLC SERPL-MCNC: 223 MG/DL
NUCLEATED RBC (/100WBC) (OHS): 0 /100 WBC
PLATELET # BLD AUTO: 184 K/UL (ref 150–450)
PMV BLD AUTO: 14 FL (ref 9.2–12.9)
POTASSIUM SERPL-SCNC: 4.4 MMOL/L (ref 3.5–5.1)
PROT SERPL-MCNC: 8.2 GM/DL (ref 6–8.4)
PSA SERPL-MCNC: 0.59 NG/ML
RBC # BLD AUTO: 4.57 M/UL (ref 4.6–6.2)
RELATIVE EOSINOPHIL (OHS): 2.7 %
RELATIVE LYMPHOCYTE (OHS): 33.5 % (ref 18–48)
RELATIVE MONOCYTE (OHS): 7.5 % (ref 4–15)
RELATIVE NEUTROPHIL (OHS): 54.5 % (ref 38–73)
SODIUM SERPL-SCNC: 136 MMOL/L (ref 136–145)
TRIGL SERPL-MCNC: 1821 MG/DL (ref 30–150)
WBC # BLD AUTO: 4.78 K/UL (ref 3.9–12.7)

## 2025-06-16 PROCEDURE — 85025 COMPLETE CBC W/AUTO DIFF WBC: CPT

## 2025-06-16 PROCEDURE — 3077F SYST BP >= 140 MM HG: CPT | Mod: CPTII,S$GLB,, | Performed by: INTERNAL MEDICINE

## 2025-06-16 PROCEDURE — 83036 HEMOGLOBIN GLYCOSYLATED A1C: CPT

## 2025-06-16 PROCEDURE — 84075 ASSAY ALKALINE PHOSPHATASE: CPT

## 2025-06-16 PROCEDURE — 3080F DIAST BP >= 90 MM HG: CPT | Mod: CPTII,S$GLB,, | Performed by: INTERNAL MEDICINE

## 2025-06-16 PROCEDURE — 3008F BODY MASS INDEX DOCD: CPT | Mod: CPTII,S$GLB,, | Performed by: INTERNAL MEDICINE

## 2025-06-16 PROCEDURE — 84153 ASSAY OF PSA TOTAL: CPT

## 2025-06-16 PROCEDURE — 36415 COLL VENOUS BLD VENIPUNCTURE: CPT

## 2025-06-16 PROCEDURE — 83718 ASSAY OF LIPOPROTEIN: CPT

## 2025-06-16 PROCEDURE — G2211 COMPLEX E/M VISIT ADD ON: HCPCS | Mod: S$GLB,,, | Performed by: INTERNAL MEDICINE

## 2025-06-16 PROCEDURE — 4010F ACE/ARB THERAPY RXD/TAKEN: CPT | Mod: CPTII,S$GLB,, | Performed by: INTERNAL MEDICINE

## 2025-06-16 PROCEDURE — 99214 OFFICE O/P EST MOD 30 MIN: CPT | Mod: S$GLB,,, | Performed by: INTERNAL MEDICINE

## 2025-06-16 PROCEDURE — 1160F RVW MEDS BY RX/DR IN RCRD: CPT | Mod: CPTII,S$GLB,, | Performed by: INTERNAL MEDICINE

## 2025-06-16 PROCEDURE — 99999 PR PBB SHADOW E&M-EST. PATIENT-LVL III: CPT | Mod: PBBFAC,,, | Performed by: INTERNAL MEDICINE

## 2025-06-16 PROCEDURE — 1159F MED LIST DOCD IN RCRD: CPT | Mod: CPTII,S$GLB,, | Performed by: INTERNAL MEDICINE

## 2025-06-16 RX ORDER — CITALOPRAM 10 MG/1
10 TABLET ORAL DAILY
Qty: 90 TABLET | Refills: 1 | Status: SHIPPED | OUTPATIENT
Start: 2025-06-16

## 2025-06-16 RX ORDER — METFORMIN HYDROCHLORIDE 500 MG/1
500 TABLET ORAL 2 TIMES DAILY WITH MEALS
Qty: 180 TABLET | Refills: 11 | Status: SHIPPED | OUTPATIENT
Start: 2025-06-16

## 2025-06-16 RX ORDER — INSULIN GLARGINE 100 [IU]/ML
20 INJECTION, SOLUTION SUBCUTANEOUS NIGHTLY
Qty: 18 ML | Refills: 11 | Status: SHIPPED | OUTPATIENT
Start: 2025-06-16

## 2025-06-16 RX ORDER — LISINOPRIL AND HYDROCHLOROTHIAZIDE 20; 25 MG/1; MG/1
1 TABLET ORAL DAILY
Qty: 90 TABLET | Refills: 11 | Status: SHIPPED | OUTPATIENT
Start: 2025-06-16

## 2025-06-16 RX ORDER — ATORVASTATIN CALCIUM 40 MG/1
40 TABLET, FILM COATED ORAL DAILY
Qty: 90 TABLET | Refills: 11 | Status: SHIPPED | OUTPATIENT
Start: 2025-06-16

## 2025-06-16 RX ORDER — TADALAFIL 20 MG/1
TABLET ORAL
Qty: 10 TABLET | Refills: 11 | Status: SHIPPED | OUTPATIENT
Start: 2025-06-16

## 2025-06-16 RX ORDER — FENOFIBRATE 160 MG/1
160 TABLET ORAL DAILY
Qty: 90 TABLET | Refills: 11 | Status: SHIPPED | OUTPATIENT
Start: 2025-06-16

## 2025-06-16 NOTE — TELEPHONE ENCOUNTER
[facility]Population Health Chart Review & Patient Outreach Details For Medication Adherence Project    Reason for Outreach Encounter: 3rd Party payor non-compliance report (Hum[employer], BCBS, C, etc)  2.  Patient Outreach Method: Reviewed Patient Chart  3.   Medication in question: Lisinopril HCTZ 20/25mg   LAST FILLED: 4/29/25 for 90 day supply  Hypertension Medications              lisinopriL-hydrochlorothiazide (PRINZIDE,ZESTORETIC) 20-25 mg Tab Take 1 tablet by mouth once daily.              4.  Reviewed and or Updates Made To: Patient Chart  5. Outreach Outcomes and/or actions taken: Patient filled medication and is on track to be adherent

## 2025-06-16 NOTE — PROGRESS NOTES
Subjective     Patient ID: Jesusita De La Cruz is a 55 y.o. male.    Chief Complaint: No chief complaint on file.             History of Present Illness    CHIEF COMPLAINT:  Jesusita presents today for follow up    DIABETES MANAGEMENT:  He reports blood sugars running high in the 300s. After implementing dietary changes, particularly incorporating green beans, his blood sugar improved to 170 without medications on one occasion. He uses Lantus insulin daily and Mounjaro weekly intermittently.    CURRENT MEDICATIONS:  He takes metformin for diabetes, baby aspirin, citalopram for anxiety, and lisinopril hydrochlorothiazide 20/25 for blood pressure. He also takes atorvastatin and fenofibrate for cholesterol management. He reports having run out of some medications.    MENTAL HEALTH:  He reports feeling easily worked up and anxious, particularly in medical settings.    SOCIAL HISTORY:  He reports being overwhelmed with work and family responsibilities, feeling unable to find time for himself. He has five children and recently experienced the loss of a grandchild.    REVIEW OF SYSTEMS:  He denies chest pain and shortness of breath. He reports normal bowel movements and urination.      ROS:  Constitutional: negative activity change, negative unexpected weight change  HENT: negative trouble swallowing  Eyes: negative discharge, negative visual disturbance  Respiratory: negative chest tightness, negative wheezing, negative shortness of breath  Cardiovascular: negative chest pain, negative palpitations  Gastrointestinal: negative blood in stool, negative constipation, negative diarrhea, negative vomiting  Endocrine: negative polydipsia, negative polyuria  Genitourinary: negative difficulty urinating, negative dysuria, negative hematuria  Musculoskeletal: negative arthralgias, negative joint swelling, negative neck pain  Neurological: negative weakness, negative headaches  Psychiatric/Behavioral: negative confusion, negative  dysphoric mood  Psychiatric: +anxiety  Male Genitourinary: +erectile dysfunction           HPI  Review of Systems     Objective     Physical Exam  Vitals reviewed.   Constitutional:       General: He is not in acute distress.     Appearance: Normal appearance. He is well-developed. He is obese. He is not ill-appearing, toxic-appearing or diaphoretic.   HENT:      Head: Normocephalic and atraumatic.   Eyes:      General: No scleral icterus.  Neck:      Thyroid: No thyromegaly.   Cardiovascular:      Rate and Rhythm: Normal rate and regular rhythm.      Heart sounds: Normal heart sounds. No murmur heard.     No friction rub. No gallop.   Pulmonary:      Effort: Pulmonary effort is normal. No respiratory distress.      Breath sounds: Normal breath sounds. No wheezing or rales.   Abdominal:      General: Bowel sounds are normal. There is no distension.      Palpations: Abdomen is soft. There is no mass.      Tenderness: There is no abdominal tenderness. There is no guarding or rebound.   Musculoskeletal:         General: Normal range of motion.      Cervical back: Normal range of motion.   Lymphadenopathy:      Cervical: No cervical adenopathy.   Skin:     Findings: No lesion.   Neurological:      Mental Status: He is alert and oriented to person, place, and time.   Psychiatric:         Mood and Affect: Mood normal.         Thought Content: Thought content normal.            Assessment and Plan     1. Essential hypertension  -     lisinopriL-hydrochlorothiazide (PRINZIDE,ZESTORETIC) 20-25 mg Tab; Take 1 tablet by mouth once daily.  Dispense: 90 tablet; Refill: 11    2. Anxiety  -     citalopram (CELEXA) 10 MG tablet; Take 1 tablet (10 mg total) by mouth once daily.  Dispense: 90 tablet; Refill: 1    3. Type 2 diabetes mellitus with diabetic polyneuropathy, without long-term current use of insulin  -     CBC Auto Differential; Future; Expected date: 06/16/2025  -     Comprehensive Metabolic Panel; Future; Expected date:  06/16/2025  -     Hemoglobin A1C; Future; Expected date: 06/16/2025  -     atorvastatin (LIPITOR) 40 MG tablet; Take 1 tablet (40 mg total) by mouth once daily.  Dispense: 90 tablet; Refill: 11  -     metFORMIN (GLUCOPHAGE) 500 MG tablet; Take 1 tablet (500 mg total) by mouth 2 (two) times daily with meals.  Dispense: 180 tablet; Refill: 11  -     insulin glargine U-100, Lantus, (LANTUS SOLOSTAR U-100 INSULIN) 100 unit/mL (3 mL) InPn pen; Inject 20 Units into the skin every evening.  Dispense: 18 mL; Refill: 11    4. Hypertriglyceridemia  -     Lipid Panel; Future; Expected date: 06/16/2025  -     fenofibrate 160 MG Tab; Take 1 tablet (160 mg total) by mouth once daily.  Dispense: 90 tablet; Refill: 11    5. Screening for malignant neoplasm of prostate  -     PSA, Screening; Future; Expected date: 06/16/2025    6. Erectile dysfunction, unspecified erectile dysfunction type  -     tadalafiL (CIALIS) 20 MG Tab; Take 1 tablet by mouth every 36 hours as needed  Dispense: 10 tablet; Refill: 11        Assessment & Plan    TYPE 2 DIABETES MELLITUS WITH DIABETIC POLYNEUROPATHY, WITHOUT LONG-TERM CURRENT USE OF INSULIN:  - Monitored glucose levels, noting elevations in the 300s range with improvement to 170 after consumption of green beans without medication.  - Ordered labs today to evaluate diabetes control and discussed potential health risks associated with uncontrolled diabetes.  - Recommend continuing dietary improvements, emphasizing the positive effects of green beans on glucose levels.  - Continued current medication regimen including metformin, weekly Mounjaro (tirzepatide) injection for weight management (prescribed by Dr. Austin), and Lantus insulin for glycemic control.  - Arranged for prescriptions to be sent to [facility] pharmacy.    HYPERTENSION:  - Measured blood pressure at 150/90, confirming hypertension.  - Emphasized the importance of maintaining blood pressure closer to 120/80 and discussed potential  health risks associated with uncontrolled hypertension.  - Emphasized medication adherence and timely prescription refills.  - Continued lisinopril HCTZ 20/25 for blood pressure management with prescription sent to pharmacy.  Uncontrolled HTN likely 2/2 med non-adherence    HYPERLIPIDEMIA:  - Continued atorvastatin (Lipitor) for cholesterol management and fenofibrate for triglyceride and cholesterol control.    ANXIETY DISORDER:  - Evaluated anxiety symptoms, noting feelings of being easily agitated with fluctuating severity.  - Considered impact of significant life stressors on health.  - Recommend consultation with mental health professional for anxiety management strategies.  - Continued citalopram with prescription refill arranged.    ERECTILE DYSFUNCTION:  - Discussed medication cost concerns regarding Cialis.  - Sent prescription to in-house pharmacy for potentially lower cost.    ASPIRIN THERAPY:  - Continued daily baby aspirin therapy.    GENERAL HEALTH MANAGEMENT:  - Evaluated medication adherence and identified gaps in regimen.  - Ordered labs to reassess overall health status and prostate cancer screening blood test.  - Encouraged patient to make time for self-care despite busy schedule.    FOLLOW-UP:  - Follow up in approximately 5 weeks (July).               Follow up in about 5 weeks (around 7/21/2025).    Visit today included increased complexity associated with the care of the episodic problem  addressed and managing the longitudinal care of the patient due to the serious and/or complex managed problem(s) .    No future appointments.      This note was generated with the assistance of ambient listening technology. Verbal consent was obtained by the patient and accompanying visitor(s) for the recording of patient appointment to facilitate this note. I attest to having reviewed and edited the generated note for accuracy, though some syntax or spelling errors may persist. Please contact the author of  this note for any clarification.

## 2025-06-25 ENCOUNTER — RESULTS FOLLOW-UP (OUTPATIENT)
Dept: INTERNAL MEDICINE | Facility: CLINIC | Age: 55
End: 2025-06-25

## 2025-06-25 DIAGNOSIS — E11.42 TYPE 2 DIABETES MELLITUS WITH DIABETIC POLYNEUROPATHY, WITHOUT LONG-TERM CURRENT USE OF INSULIN: ICD-10-CM

## 2025-06-25 DIAGNOSIS — E78.1 HYPERTRIGLYCERIDEMIA: ICD-10-CM

## 2025-06-25 RX ORDER — FENOFIBRATE 160 MG/1
160 TABLET ORAL DAILY
Qty: 90 TABLET | Refills: 3 | Status: SHIPPED | OUTPATIENT
Start: 2025-06-25

## 2025-06-25 RX ORDER — BLOOD-GLUCOSE SENSOR
1 EACH MISCELLANEOUS
Qty: 9 EACH | Refills: 3 | Status: SHIPPED | OUTPATIENT
Start: 2025-06-25

## 2025-06-25 RX ORDER — ATORVASTATIN CALCIUM 40 MG/1
40 TABLET, FILM COATED ORAL DAILY
Qty: 90 TABLET | Refills: 3 | Status: SHIPPED | OUTPATIENT
Start: 2025-06-25

## 2025-06-25 NOTE — TELEPHONE ENCOUNTER
I spoke with the patients wife Leigha and made her aware of the patients elevated levels. She stated that the pharmacy they go to system was down so they did not get the medication. I resent the refill order to the centralized refill pool so they can try to go get it again. I also explained to her the importance of the patient taking his medication as prescribed and trying to maintain a healthy diet. She verbalized understanding and we were able to schedule an appointment for 07/29 @ 10:40am.

## 2025-06-25 NOTE — TELEPHONE ENCOUNTER
No care due was identified.  [facility] Embedded Care Due Messages. Reference number: [id number].   6/25/2025 9:29:55 AM CDT

## 2025-06-25 NOTE — TELEPHONE ENCOUNTER
Hi, please call the patient.  His triglyceride level and cholesterol level was very elevated.  This is frequently seen in patients with diabetes that is not well controlled and who are eating a lot of fried food and fatty foods.    It is really important that he take the medicines fenofibrate and atorvastatin to lower his cholesterol and triglycerides.  I sent these in on June 16th, please ask if he has been taking these daily.    Also please ask if he has been taking the daily insulin.    Please offer him an appointment with me in late July before school starts for him so we can check in on how he is doing overall in how he is doing with the medications.    Let me know if patient has any questions.  Thank you, Jonathan Boateng

## 2025-06-25 NOTE — TELEPHONE ENCOUNTER
Refill Decision Note   Jesusita De La Cruz  is requesting a refill authorization.  Brief Assessment and Rationale for Refill:  Approve     Medication Therapy Plan:        Comments:     Note composed:10:01 AM 06/25/2025

## 2025-07-16 ENCOUNTER — TELEPHONE (OUTPATIENT)
Dept: PHARMACY | Facility: CLINIC | Age: 55
End: 2025-07-16
Payer: COMMERCIAL

## 2025-07-16 NOTE — TELEPHONE ENCOUNTER
[facility]/Population Health Chart Review & Patient Outreach Details For Medication Adherence Project    Reason for Outreach Encounter: 3rd Party payor non-compliance report ([employer], [employer], [employer], etc)  2.  Patient Outreach Method: Reviewed patient chart   3.   Medication in question:    Hyperlipidemia Medications              atorvastatin (LIPITOR) 40 MG tablet Take 1 tablet (40 mg total) by mouth once daily.    fenofibrate 160 MG Tab Take 1 tablet (160 mg total) by mouth once daily.                  LF 90 ds 6/17/25    4.  Reviewed and or Updates Made To: Patient Chart  5. Outreach Outcomes and/or actions taken: Patient filled medication and is on track to be adherent  Additional Notes:

## 2025-07-22 DIAGNOSIS — E11.42 TYPE 2 DIABETES MELLITUS WITH DIABETIC POLYNEUROPATHY, WITHOUT LONG-TERM CURRENT USE OF INSULIN: ICD-10-CM

## 2025-07-22 DIAGNOSIS — F41.9 ANXIETY: ICD-10-CM

## 2025-07-22 NOTE — TELEPHONE ENCOUNTER
Unable to retrieve patient chart and identify care due.  [facility] Embedded Care Due Messages. Reference number: [id number].   7/22/2025 10:56:13 AM CDT

## 2025-07-23 RX ORDER — CITALOPRAM 10 MG/1
10 TABLET ORAL DAILY
Qty: 90 TABLET | Refills: 3 | Status: SHIPPED | OUTPATIENT
Start: 2025-07-23

## 2025-07-23 RX ORDER — ATORVASTATIN CALCIUM 40 MG/1
40 TABLET, FILM COATED ORAL DAILY
Qty: 90 TABLET | Refills: 3 | Status: SHIPPED | OUTPATIENT
Start: 2025-07-23

## 2025-07-23 NOTE — TELEPHONE ENCOUNTER
Refill Decision Note   Jesusita De La Cruz  is requesting a refill authorization.  Brief Assessment and Rationale for Refill:  Approve     Medication Therapy Plan:         Comments:     Note composed:8:56 AM 07/23/2025

## 2025-07-29 ENCOUNTER — PATIENT OUTREACH (OUTPATIENT)
Dept: ADMINISTRATIVE | Facility: HOSPITAL | Age: 55
End: 2025-07-29
Payer: COMMERCIAL

## 2025-07-29 DIAGNOSIS — Z87.891 FORMER SMOKER: Primary | ICD-10-CM

## 2025-07-29 DIAGNOSIS — E11.42 TYPE 2 DIABETES MELLITUS WITH DIABETIC POLYNEUROPATHY, WITHOUT LONG-TERM CURRENT USE OF INSULIN: ICD-10-CM

## 2025-07-29 NOTE — PROGRESS NOTES
"HM and immunization's reviewed and updated.  Health Maintenance Due   Topic Date Due    Colorectal Cancer Screening  Never done    Pneumococcal Vaccines (Age 50+) (2 of 2 - PCV) 04/16/2020    Foot Exam  04/16/2020    LDCT Lung Screen  Never done    Shingles Vaccine (1 of 2) Never done    Diabetes Urine Screening  08/19/2024    COVID-19 Vaccine (4 - 2024-25 season) 09/01/2024    Diabetic Eye Exam  11/20/2024     IF HEALTH MAINTENANCE ALREADY DONE, PLEASE RECORDS INFORMATION.  COMM. GAP REPORT: "BLOOD PRESSURE CONTROL." -need remote blood pressure. Sent [facility] message.  If no response by 7/31/2025 will call patient.   Orders placed for urine screening and lung.   Records document 2/24/2021: 30 years smoker, quit 1/1/2011, 1 pack a day.       Martha Quevedo MA  Panel Care Coordinator  [facility]  [phone]  For Jonathan Boateng MD  "

## 2025-08-01 ENCOUNTER — PATIENT OUTREACH (OUTPATIENT)
Dept: ADMINISTRATIVE | Facility: HOSPITAL | Age: 55
End: 2025-08-01
Payer: COMMERCIAL

## 2025-08-01 NOTE — PROGRESS NOTES
"HM and immunization's reviewed and updated.  Health Maintenance Due   Topic Date Due    Colorectal Cancer Screening  Never done    Pneumococcal Vaccines (Age 50+) (2 of 2 - PCV) 04/16/2020    Foot Exam  04/16/2020    LDCT Lung Screen  Never done    Shingles Vaccine (1 of 2) Never done    Diabetes Urine Screening  08/19/2024    COVID-19 Vaccine (4 - 2024-25 season) 09/01/2024    Diabetic Eye Exam  11/20/2024     IF HEALTH MAINTENANCE ALREADY DONE, PLEASE RECORDS INFORMATION.  COMM. GAP REPORT: "BLOOD PRESSURE CONTROL." -need remote blood pressure. Sent [facility] message. No response. Have upcoming visit 9/9/2025.  Scheduled urine at next visit and place in note due for lung screening.  Left message for patient to return call.  Records document 2/24/2021: 30 years smoker quit 1/1/2011, 1 pack a day.       Martha Quevedo MA  Panel Care Coordinator  [facility]  [phone]  For Jonathan Boateng MD  "

## 2025-08-17 ENCOUNTER — TELEPHONE (OUTPATIENT)
Dept: PHARMACY | Facility: CLINIC | Age: 55
End: 2025-08-17
Payer: COMMERCIAL

## 2025-08-28 ENCOUNTER — TELEPHONE (OUTPATIENT)
Dept: INTERNAL MEDICINE | Facility: CLINIC | Age: 55
End: 2025-08-28
Payer: COMMERCIAL

## 2025-08-29 ENCOUNTER — OFFICE VISIT (OUTPATIENT)
Dept: INTERNAL MEDICINE | Facility: CLINIC | Age: 55
End: 2025-08-29
Payer: COMMERCIAL

## 2025-08-29 DIAGNOSIS — E11.42 TYPE 2 DIABETES MELLITUS WITH DIABETIC POLYNEUROPATHY, WITHOUT LONG-TERM CURRENT USE OF INSULIN: Primary | ICD-10-CM

## 2025-09-04 ENCOUNTER — PATIENT OUTREACH (OUTPATIENT)
Dept: ADMINISTRATIVE | Facility: HOSPITAL | Age: 55
End: 2025-09-04
Payer: COMMERCIAL

## 2025-09-04 ENCOUNTER — TELEPHONE (OUTPATIENT)
Dept: INTERNAL MEDICINE | Facility: CLINIC | Age: 55
End: 2025-09-04
Payer: COMMERCIAL